# Patient Record
Sex: FEMALE | Race: WHITE | NOT HISPANIC OR LATINO | Employment: OTHER | URBAN - METROPOLITAN AREA
[De-identification: names, ages, dates, MRNs, and addresses within clinical notes are randomized per-mention and may not be internally consistent; named-entity substitution may affect disease eponyms.]

---

## 2018-10-09 ENCOUNTER — TRANSFERRED RECORDS (OUTPATIENT)
Dept: HEALTH INFORMATION MANAGEMENT | Facility: CLINIC | Age: 78
End: 2018-10-09

## 2019-10-08 ENCOUNTER — OFFICE VISIT (OUTPATIENT)
Dept: FAMILY MEDICINE | Facility: CLINIC | Age: 79
End: 2019-10-08
Payer: COMMERCIAL

## 2019-10-08 VITALS
HEIGHT: 65 IN | WEIGHT: 130 LBS | BODY MASS INDEX: 21.66 KG/M2 | HEART RATE: 59 BPM | RESPIRATION RATE: 18 BRPM | OXYGEN SATURATION: 98 % | DIASTOLIC BLOOD PRESSURE: 58 MMHG | TEMPERATURE: 98.8 F | SYSTOLIC BLOOD PRESSURE: 136 MMHG

## 2019-10-08 DIAGNOSIS — M67.472 GANGLION CYST OF BOTH FEET: ICD-10-CM

## 2019-10-08 DIAGNOSIS — M67.471 GANGLION CYST OF BOTH FEET: ICD-10-CM

## 2019-10-08 DIAGNOSIS — M85.89 OSTEOPENIA OF MULTIPLE SITES: ICD-10-CM

## 2019-10-08 DIAGNOSIS — Z72.0 TOBACCO ABUSE: ICD-10-CM

## 2019-10-08 DIAGNOSIS — K08.109 FULL DENTURES: ICD-10-CM

## 2019-10-08 DIAGNOSIS — Z97.2 FULL DENTURES: ICD-10-CM

## 2019-10-08 DIAGNOSIS — B35.1 ONYCHOMYCOSIS: ICD-10-CM

## 2019-10-08 DIAGNOSIS — Z00.00 ENCOUNTER FOR MEDICARE ANNUAL WELLNESS EXAM: Primary | ICD-10-CM

## 2019-10-08 DIAGNOSIS — Z13.6 CARDIOVASCULAR SCREENING; LDL GOAL LESS THAN 130: ICD-10-CM

## 2019-10-08 DIAGNOSIS — Z71.89 ADVANCED CARE PLANNING/COUNSELING DISCUSSION: ICD-10-CM

## 2019-10-08 LAB
CHOLEST SERPL-MCNC: 197 MG/DL
HDLC SERPL-MCNC: 66 MG/DL
LDLC SERPL CALC-MCNC: 123 MG/DL
NONHDLC SERPL-MCNC: 131 MG/DL
TRIGL SERPL-MCNC: 41 MG/DL

## 2019-10-08 PROCEDURE — 99213 OFFICE O/P EST LOW 20 MIN: CPT | Mod: 25 | Performed by: FAMILY MEDICINE

## 2019-10-08 PROCEDURE — 90662 IIV NO PRSV INCREASED AG IM: CPT | Performed by: FAMILY MEDICINE

## 2019-10-08 PROCEDURE — 36415 COLL VENOUS BLD VENIPUNCTURE: CPT | Performed by: FAMILY MEDICINE

## 2019-10-08 PROCEDURE — G0008 ADMIN INFLUENZA VIRUS VAC: HCPCS | Performed by: FAMILY MEDICINE

## 2019-10-08 PROCEDURE — 99387 INIT PM E/M NEW PAT 65+ YRS: CPT | Mod: 25 | Performed by: FAMILY MEDICINE

## 2019-10-08 PROCEDURE — 80061 LIPID PANEL: CPT | Performed by: FAMILY MEDICINE

## 2019-10-08 ASSESSMENT — MIFFLIN-ST. JEOR: SCORE: 1070.56

## 2019-10-08 NOTE — PROGRESS NOTES
"  SUBJECTIVE:   Nadiya Drummond is a 79 year old female who presents for Preventive Visit and chronic disease management.  Are you in the first 12 months of your Medicare Part B coverage?  No    Cysts of feet      Duration: months to years    Description  Location: intermittently painful cysts on top of both feet that are bothering her and she'd like to address. She says she dropped a board on her right foot several years ago by accident, and that cyst did get temporarially smaller.    Accompanying signs and symptoms: none    Precipitating or alleviating factors:  Trauma or overuse: no   Aggravating factors include: walking    Toenail fungus      Duration: years    Description  Location: all the toenails are affected, but she has particularly severe case of her large toes with peeling, cracking nails, and she's wondering if she could have her nails removed  Itching: no    Therapies tried and outcome: none    Physical Health:    In general, how would you rate your overall physical health? good    Outside of work, how many days during the week do you exercise? 2-3 days/week    Outside of work, approximately how many minutes a day do you exercise?15-30 minutes    If you drink alcohol do you typically have >3 drinks per day or >7 drinks per week? No    Do you usually eat at least 4 servings of fruit and vegetables a day, include whole grains & fiber and avoid regularly eating high fat or \"junk\" foods? NO    Do you have any problems taking medications regularly?  No    Do you have any side effects from medications? not applicable    Needs assistance for the following daily activities: no assistance needed    Which of the following safety concerns are present in your home?  none identified     Hearing impairment: No    In the past 6 months, have you been bothered by leaking of urine? yes    Mental Health:    In general, how would you rate your overall mental or emotional health? good  PHQ-2 Score:      Do you feel safe in " your environment? Yes    Do you have a Health Care Directive? No: Advance care planning reviewed with patient; information given to patient to review.    Additional concerns to address?  No    Fall risk:  Fallen 2 or more times in the past year?: No  Any fall with injury in the past year?: No    PHQ-2 Score:     PHQ-2 (  [employer]) 10/8/2019 2016   Q1: Little interest or pleasure in doing things 0 0   Q2: Feeling down, depressed or hopeless 0 0   PHQ-2 Score 0 0         Cognitive Screenin) Repeat 3 items (Leader, Season, Table)    2) Clock draw: NORMAL  3) 3 item recall: Recalls 3 objects  Results: 3 items recalled: COGNITIVE IMPAIRMENT LESS LIKELY    Mini-CogTM Copyright S Michael. Licensed by the author for use in [facility]; reprinted with permission (faby@[facility]). All rights reserved.      Do you have sleep apnea, excessive snoring or daytime drowsiness?: no          Reviewed and updated as needed this visit by clinical staff  Tobacco  Allergies  Meds  Problems  Med Hx  Surg Hx  Fam Hx  Soc Hx          Reviewed and updated as needed this visit by Provider  Problems        Social History     Tobacco Use     Smoking status: Current Every Day Smoker     Packs/day: 0.00     Years: 40.00     Pack years: 0.00     Types: Cigarettes     Smokeless tobacco: Never Used     Tobacco comment: 2 pks. weekly   Substance Use Topics     Alcohol use: No     Alcohol/week: 0.0 standard drinks                           Current providers sharing in care for this patient include:   Patient Care Team:  Carmen Beckett MD as PCP - General (Family Practice)  Carmen Beckett MD as Assigned PCP    The following health maintenance items are reviewed in Epic and correct as of today:  Health Maintenance   Topic Date Due     ZOSTER IMMUNIZATION (2 of 3) 2012     DEXA  10/04/2015     ADVANCE CARE PLANNING  2017     MEDICARE ANNUAL WELLNESS VISIT  2017     LIPID  2017      "FALL RISK ASSESSMENT  09/29/2017     PHQ-2  01/01/2019     INFLUENZA VACCINE (1) 09/01/2019     DTAP/TDAP/TD IMMUNIZATION (3 - Td) 09/29/2026     PNEUMOCOCCAL IMMUNIZATION 65+ LOW/MEDIUM RISK  Completed     IPV IMMUNIZATION  Aged Out     MENINGITIS IMMUNIZATION  Aged Out         ROS:  Constitutional, HEENT, cardiovascular, pulmonary, GI, , musculoskeletal, neuro, skin, endocrine and psych systems are negative, except as otherwise noted.    OBJECTIVE:   /58 (BP Location: Right arm, Patient Position: Sitting, Cuff Size: Adult Regular)   Pulse 59   Temp 98.8  F (37.1  C) (Tympanic)   Resp 18   Ht 1.651 m (5' 5\")   Wt 59 kg (130 lb)   SpO2 98%   BMI 21.63 kg/m   Estimated body mass index is 21.63 kg/m  as calculated from the following:    Height as of this encounter: 1.651 m (5' 5\").    Weight as of this encounter: 59 kg (130 lb).  EXAM:   GENERAL: healthy, alert and no distress. Frail, elderly.  EYES: Eyes grossly normal to inspection, PERRL and conjunctivae and sclerae normal  HENT: ear canals and TM's normal, nose and mouth without ulcers or lesions. Adentulous, dentures present upper and lower.  NECK: no adenopathy, no asymmetry, masses, or scars and thyroid normal to palpation  RESP: lungs clear to auscultation - no rales, rhonchi or wheezes  CV: regular rate and rhythm, normal S1 S2, no S3 or S4, no murmur, click or rub, no peripheral edema and peripheral pulses strong  ABDOMEN: soft, nontender, no hepatosplenomegaly, no masses and bowel sounds normal  MS: no gross musculoskeletal defects noted, no edema  SKIN: no suspicious lesions or rashes  NEURO: Normal strength and tone, mentation intact and speech normal  PSYCH: mentation appears normal, affect normal/bright  Diabetic foot exam: normal DP and PT pulses, no trophic changes or ulcerative lesions, normal sensory exam and onychomycosis noted with peeled, split great nails noted. Small, mobile, subcutaneous massed noted, one each of top of foot, no " overlying skin changes noted.    ASSESSMENT / PLAN:   1. Encounter for Medicare annual wellness exam  routine  - HC FLU VACCINE, INCREASED ANTIGEN, PRESV FREE [42210]    2. Osteopenia of multiple sites  Noted previously , due for follow up dexa, see order.  Will fu as indicated.   - DX Hip/Pelvis/Spine; Future    3. Ganglion cyst of both feet  Bothersome to Nadiya, and she requests podiatry referral.  - PODIATRY/FOOT & ANKLE SURGERY REFERRAL    4. Onychomycosis  See HPI, referral as below.  - PODIATRY/FOOT & ANKLE SURGERY REFERRAL    5. CARDIOVASCULAR SCREENING; LDL GOAL LESS THAN 130  LDL Cholesterol Calculated   Date Value Ref Range Status   09/29/2016 126 (H) <100 mg/dL Final     Comment:     Above desirable:  100-129 mg/dl   Borderline High:  130-159 mg/dL   High:             160-189 mg/dL   Very high:       >189 mg/dl      at goal  - Lipid panel reflex to direct LDL Fasting    6. Tobacco abuse  Motivational Interviewing  Target Behavior: smoking    Stage of Change: CONTEMPLATION (Considering change and yet undecided)    MI Intervention: Change talk (evoked)     Change Talk Expressed by the Patient: Desire to change Need to change    Provider Response to Change Talk: E - Evoked more info from patient about behavior change, A - Affirmed patient's thoughts, decisions, or attempts at behavior change, R - Reflected patient's change talk and S - Summarized patient's change talk statements     - TOBACCO USE TXMNT COUNSELING    7. Advanced care planning/counseling discussion  Paperwork provided today    8. Full dentures  Noted on exam      End of Life Planning:  Patient currently has an advanced directive: paperwork provided today    COUNSELING:  Reviewed preventive health counseling, as reflected in patient instructions       Immunizations    See AVS             Osteoporosis Prevention/Bone Health    Estimated body mass index is 21.63 kg/m  as calculated from the following:    Height as of this encounter: 1.651 m (5'  "5\").    Weight as of this encounter: 59 kg (130 lb).         reports that she has been smoking cigarettes. She has been smoking about 0.00 packs per day for the past 40.00 years. She has never used smokeless tobacco.  Tobacco Cessation Action Plan: discussion as above, she feels she could try \"cold turkey\".    Appropriate preventive services were discussed with this patient, including applicable screening as appropriate for cardiovascular disease, diabetes, osteopenia/osteoporosis, and glaucoma.  As appropriate for age/gender, discussed screening for colorectal cancer, prostate cancer, breast cancer, and cervical cancer. Checklist reviewing preventive services available has been given to the patient.    Reviewed patients plan of care and provided an AVS. The Intermediate Care Plan ( asthma action plan, low back pain action plan, and migraine action plan) for Nadiya meets the Care Plan requirement. This Care Plan has been established and reviewed with the Patient.    Counseling Resources:  ATP IV Guidelines  Pooled Cohorts Equation Calculator  Breast Cancer Risk Calculator  FRAX Risk Assessment  ICSI Preventive Guidelines  Dietary Guidelines for Americans, 2010  USDA's MyPlate  ASA Prophylaxis  Lung CA Screening    Carmen Beckett MD  [facility]  "

## 2019-10-08 NOTE — PATIENT INSTRUCTIONS
"I strongly recommend getting the shingles vaccine (Shingrix) if you are over age 50, but please check with your insurance to see how much you might have to pay for this vaccine! If you are on Medicare, it's covered if you get it at a pharmacy but not in a clinic.    This shot will prevent shingles, a painful skin rash that you are at risk for getting if you have ever had chicken pox. Sometimes the pain will last the rest of your life, even after the rash has healed, and there is not much that we can do to relieve the pain.    Please consider getting this vaccine!      Patient Education   Personalized Prevention Plan  You are due for the preventive services outlined below.  Your care team is available to assist you in scheduling these services.  If you have already completed any of these items, please share that information with your care team to update in your medical record.  Health Maintenance Due   Topic Date Due     Zoster (Shingles) Vaccine (2 of 3) 11/12/2012     Osteoporosis Screening  10/04/2015     Discuss Advance Care Planning  09/17/2017     Annual Wellness Visit  09/29/2017     Cholesterol Lab  09/29/2017     FALL RISK ASSESSMENT  09/29/2017     PHQ-2  01/01/2019     Flu Vaccine (1) 09/01/2019         Ht Readings from Last 2 Encounters:   10/08/19 1.651 m (5' 5\")   09/29/16 1.664 m (5' 5.5\")     Patient Education   Preventive Health Recommendations    See your health care provider every year to    Review health changes.     Discuss preventive care.      Review your medicines if your doctor has prescribed any.    You no longer need a yearly Pap test unless you've had an abnormal Pap test in the past 10 years. If you have vaginal symptoms, such as bleeding or discharge, be sure to talk with your provider about a Pap test.    Every 1 to 2 years, have a mammogram.  If you are over 69, talk with your health care provider about whether or not you want to continue having screening mammograms.    Every 10 years, " have a colonoscopy. Or, have a yearly FIT test (stool test). These exams will check for colon cancer.     Have a cholesterol test every 5 years, or more often if your doctor advises it.     Have a diabetes test (fasting glucose) every three years. If you are at risk for diabetes, you should have this test more often.     At age 65, have a bone density scan (DEXA) to check for osteoporosis (brittle bone disease).    Shots:    Get a flu shot each year.    Get a tetanus shot every 10 years.    Talk to your doctor about your pneumonia vaccines. There are now two you should receive - Pneumovax (PPSV 23) and Prevnar (PCV 13).    Talk to your pharmacist about the shingles vaccine.    Talk to your doctor about the hepatitis B vaccine.    Nutrition:     Eat at least 5 servings of fruits and vegetables each day.    Eat whole-grain bread, whole-wheat pasta and brown rice instead of white grains and rice.    Get adequate Calcium and Vitamin D.     Lifestyle    Exercise at least 150 minutes a week (30 minutes a day, 5 days a week). This will help you control your weight and prevent disease.    Limit alcohol to one drink per day.    No smoking.     Wear sunscreen to prevent skin cancer.     See your dentist twice a year for an exam and cleaning.    See your eye doctor every 1 to 2 years to screen for conditions such as glaucoma, macular degeneration and cataracts.    Personalized Prevention Plan  You are due for the preventive services outlined below.  Your care team is available to assist you in scheduling these services.  If you have already completed any of these items, please share that information with your care team to update in your medical record.  Health Maintenance Due   Topic Date Due     Zoster (Shingles) Vaccine (2 of 3) 11/12/2012     Osteoporosis Screening  10/04/2015     Discuss Advance Care Planning  09/17/2017     Annual Wellness Visit  09/29/2017     Cholesterol Lab  09/29/2017     FALL RISK ASSESSMENT   09/29/2017     PHQ-2  01/01/2019     Flu Vaccine (1) 09/01/2019         It is time for your dexa!  Please call our clinic to schedule this test. You do not need to fast prior to the test, although it is recommended that you NOT take calcium on the day of the test.    If you have any questions, please contact the clinic or schedule an appointment with me, thank you!     [facility]  [address] [place] [address]     Phone: [phone]  Fax: [phone]

## 2019-10-08 NOTE — LETTER
[facility]  [address] [place] [id number] [phone]      October 18, 2019    Nadiya Drummond                                                                                                                     [address] [facility] [address]        Dear Nadiya,  Thank you for getting your cholesterol checked!   Desired or goal levels are:   CHOLESTEROL: Desirable is less than 200.   HDL (Good Cholesterol): Desirable is greater than 40 in men and greater than 50 in women.   LDL (Bad Cholesterol): Desirable is less than 130   TRIGLYCERIDES: Desirable is less than 150.     Your cholesterol is fantastic!  Your total cholesterol is low, and your HDL, or good cholesterol, is high, which is great, as this protects your heart from heart disease and shows that you get a good amount of exercise.       Your triglycerides are also very low.  Triglycerides are increased by eating lots of sugar, including juice, excess fruit, bread, pasta, rice and cereal, so you must not eat a lot of these things (or you have good genes!)       I recommend rechecking your fasting lipids every few years.   If you have any questions, please contact the clinic or schedule an appointment with me, thank you!     Results for orders placed or performed in visit on 10/08/19   Lipid panel reflex to direct LDL Fasting   Result Value Ref Range    Cholesterol 197 <200 mg/dL    Triglycerides 41 <150 mg/dL    HDL Cholesterol 66 >49 mg/dL    LDL Cholesterol Calculated 123 (H) <100 mg/dL    Non HDL Cholesterol 131 (H) <130 mg/dL       Sincerely,     Carmen Beckett MD.

## 2019-10-18 NOTE — RESULT ENCOUNTER NOTE
Thank you for getting your cholesterol checked!  Desired or goal levels are:  CHOLESTEROL: Desirable is less than 200.  HDL (Good Cholesterol): Desirable is greater than 40 in men and greater than 50 in women.  LDL (Bad Cholesterol): Desirable is less than 130  TRIGLYCERIDES: Desirable is less than 150.    Your cholesterol is fantastic!  Your total cholesterol is low, and your HDL, or good cholesterol, is high, which is great, as this protects your heart from heart disease and shows that you get a good amount of exercise.      Your triglycerides are also very low.  Triglycerides are increased by eating lots of sugar, including juice, excess fruit, bread, pasta, rice and cereal, so you must not eat a lot of these things (or you have good genes!)      I recommend rechecking your fasting lipids every few years.      If you have any questions, please contact the clinic or schedule an appointment with me, thank you!    Sincerely,  Dr. Carmen Beckett MD  10/17/2019

## 2019-10-29 ENCOUNTER — ANCILLARY PROCEDURE (OUTPATIENT)
Dept: BONE DENSITY | Facility: CLINIC | Age: 79
End: 2019-10-29
Attending: FAMILY MEDICINE
Payer: COMMERCIAL

## 2019-10-29 DIAGNOSIS — M85.89 OSTEOPENIA OF MULTIPLE SITES: ICD-10-CM

## 2019-10-29 PROCEDURE — 77085 DXA BONE DENSITY AXL VRT FX: CPT | Performed by: INTERNAL MEDICINE

## 2020-07-31 ENCOUNTER — NURSE TRIAGE (OUTPATIENT)
Dept: FAMILY MEDICINE | Facility: CLINIC | Age: 80
End: 2020-07-31

## 2020-07-31 NOTE — TELEPHONE ENCOUNTER
I asked her to go to  to be assessed, gave her information to the [facility] and [facility]. She said she will think about it, I asked her to call back if she has further questions.    Juliane Weiss RN      Additional Information    Negative: Shock suspected (e.g., cold/pale/clammy skin, too weak to stand, low BP, rapid pulse)    Negative: Difficult to awaken or acting confused (e.g., disoriented, slurred speech)    Negative: Fainted, and still feels dizzy afterwards    Negative: Severe difficulty breathing (e.g., struggling for each breath, speaks in single words)    Negative: Overdose (accidental or intentional) of medications    Negative: New neurologic deficit that is present now: * Weakness of the face, arm, or leg on one side of the body * Numbness of the face, arm, or leg on one side of the body * Loss of speech or garbled speech    Negative: Heart beating < 50 beats per minute OR > 140 beats per minute    Negative: Sounds like a life-threatening emergency to the triager    Negative: Chest pain    Negative: Rectal bleeding, bloody stool, or tarry-black stool    Negative: Vomiting is the main symptom    Negative: Diarrhea is the main symptom    Negative: Headache is the main symptom    Negative: Heat exhaustion suspected (i.e., dehydration from heat exposure)    Negative: Patient states that he/she is having an anxiety/panic attack    Negative: SEVERE dizziness (e.g., unable to stand, requires support to walk, feels like passing out now)    Negative: SEVERE headache or neck pain    Negative: Spinning or tilting sensation (vertigo) present now and one or more stroke risk factors (i.e., hypertension, diabetes, prior stroke/TIA, heart attack, age over 60) (Exception: prior physician evaluation for this AND no different/worse than usual)    Negative: Loss of vision or double vision    Negative: Extra heart beats OR irregular heart beating (i.e., 'palpitations')    Negative: Difficulty breathing     "Negative: Drinking very little and has signs of dehydration (e.g., no urine > 12 hours, very dry mouth, very lightheaded)    Negative: Follows bleeding (e.g., stomach, rectum, vagina) (Exception: became dizzy from sight of small amount blood)    Negative: Patient sounds very sick or weak to the triager    Spinning or tilting sensation (vertigo) present now    Answer Assessment - Initial Assessment Questions  1. DESCRIPTION: \"Describe your dizziness.\"      Off balence  2. LIGHTHEADED: \"Do you feel lightheaded?\" (e.g., somewhat faint, woozy, weak upon standing)      Yes at times  3. VERTIGO: \"Do you feel like either you or the room is spinning or tilting?\" (i.e. vertigo)      Yes  4. SEVERITY: \"How bad is it?\"  \"Do you feel like you are going to faint?\" \"Can you stand and walk?\"    - MILD - walking normally    - MODERATE - interferes with normal activities (e.g., work, school)     - SEVERE - unable to stand, requires support to walk, feels like passing out now.       Mild  5. ONSET:  \"When did the dizziness begin?\"      On and off for a week  6. AGGRAVATING FACTORS: \"Does anything make it worse?\" (e.g., standing, change in head position)      NO   7. HEART RATE: \"Can you tell me your heart rate?\" \"How many beats in 15 seconds?\"  (Note: not all patients can do this)      She said it does not feel fast  8. CAUSE: \"What do you think is causing the dizziness?\"      unknown  9. RECURRENT SYMPTOM: \"Have you had dizziness before?\" If so, ask: \"When was the last time?\" \"What happened that time?\"      Yes, it went away  10. OTHER SYMPTOMS: \"Do you have any other symptoms?\" (e.g., fever, chest pain, vomiting, diarrhea, bleeding)        Swollen gland in neck, no cheat pain, SOB, numbness in fad or one side of the body, no headache or feeling like she is going to pass out  11. PREGNANCY: \"Is there any chance you are pregnant?\" \"When was your last menstrual period?\"        No    Protocols used: DIZZINESS-A-OH      "

## 2020-11-25 ENCOUNTER — OFFICE VISIT (OUTPATIENT)
Dept: FAMILY MEDICINE | Facility: CLINIC | Age: 80
End: 2020-11-25
Payer: COMMERCIAL

## 2020-11-25 VITALS
RESPIRATION RATE: 14 BRPM | SYSTOLIC BLOOD PRESSURE: 118 MMHG | OXYGEN SATURATION: 99 % | TEMPERATURE: 97.8 F | BODY MASS INDEX: 19.8 KG/M2 | WEIGHT: 119 LBS | DIASTOLIC BLOOD PRESSURE: 66 MMHG | HEART RATE: 66 BPM

## 2020-11-25 DIAGNOSIS — H11.31 SUBCONJUNCTIVAL HEMORRHAGE OF RIGHT EYE: Primary | ICD-10-CM

## 2020-11-25 DIAGNOSIS — Z82.49 FAMILY HISTORY OF CAROTID ARTERY STENOSIS: ICD-10-CM

## 2020-11-25 PROCEDURE — 99213 OFFICE O/P EST LOW 20 MIN: CPT | Performed by: FAMILY MEDICINE

## 2020-11-25 NOTE — PROGRESS NOTES
Subjective     Nadiay Drummond is a 80 year old female who presents to clinic today for the following health issues:    HPI         Concern - burst vein   Onset: Yesterday  Description: burst vein in right eye first. Now notice right vein is larger then the left vein  Progression of Symptoms:  same and intermittent  Accompanying Signs & Symptoms: lightheaded and dizzy when she got up in the middle of night.  Previous history of similar problem: none  Therapies tried and outcome: None    She says two days ago when she woke up there was blood vessel broken in her right eye off to one side. No trauma. no pain. She has otherwise felt fine. During the night when she got up to go to the bathroom she felt dizzy, but otherwise felt fine. Eye much redder next morning. Otherwise has felt fine. She also says her neck artery is slater on the right side than the left. She notes this because her sister had to have carotid surgery for clogging in her arteries. Wonders if this is related. Her sister had some other things going on as well--pt is not sure what.       Review of Systems    as above       Objective    /66 (BP Location: Left arm, Patient Position: Sitting, Cuff Size: Adult Regular)   Pulse 66   Temp 97.8  F (36.6  C) (Tympanic)   Resp 14   Wt 54 kg (119 lb)   SpO2 99%   BMI 19.80 kg/m    Body mass index is 19.8 kg/m .  Physical Exam   GENERAL: healthy, alert and no distress  EYES: left eye normal to inspection. Right eye with subconjunctival hemorrhage present medially  NECK: no carotid bruits             Assessment & Plan     Subconjunctival hemorrhage of right eye  Reassurance given   Discussed it may take 1-2 weeks for her eye to look normal again. Pt had already done some reading about this and feels she knows what to expect, just wanted to make sure.     Family history of carotid artery stenosis  No bruits on exam today.   Asymptomatic, no history of TIA         Tobacco Cessation:   reports that she has  been smoking cigarettes. She has been smoking about 0.00 packs per day for the past 40.00 years. She has never used smokeless tobacco.          Return in about 3 months (around 2/25/2021) for Routine preventive with PCP.    Kellee De La Vega MD, MD  [facility]

## 2021-02-04 ENCOUNTER — IMMUNIZATION (OUTPATIENT)
Dept: NURSING | Facility: CLINIC | Age: 81
End: 2021-02-04
Payer: COMMERCIAL

## 2021-02-04 PROCEDURE — 91300 PR COVID VAC PFIZER DIL RECON 30 MCG/0.3 ML IM: CPT

## 2021-02-04 PROCEDURE — 0001A PR COVID VAC PFIZER DIL RECON 30 MCG/0.3 ML IM: CPT

## 2021-02-25 ENCOUNTER — IMMUNIZATION (OUTPATIENT)
Dept: NURSING | Facility: CLINIC | Age: 81
End: 2021-02-25
Attending: INTERNAL MEDICINE
Payer: COMMERCIAL

## 2021-02-25 PROCEDURE — 0002A PR COVID VAC PFIZER DIL RECON 30 MCG/0.3 ML IM: CPT

## 2021-02-25 PROCEDURE — 91300 PR COVID VAC PFIZER DIL RECON 30 MCG/0.3 ML IM: CPT

## 2021-07-16 ENCOUNTER — OFFICE VISIT (OUTPATIENT)
Dept: FAMILY MEDICINE | Facility: CLINIC | Age: 81
End: 2021-07-16
Payer: COMMERCIAL

## 2021-07-16 VITALS
BODY MASS INDEX: 20.47 KG/M2 | OXYGEN SATURATION: 97 % | WEIGHT: 123 LBS | HEART RATE: 70 BPM | RESPIRATION RATE: 15 BRPM | SYSTOLIC BLOOD PRESSURE: 131 MMHG | TEMPERATURE: 98.1 F | DIASTOLIC BLOOD PRESSURE: 69 MMHG

## 2021-07-16 DIAGNOSIS — R07.9 CHEST PAIN, UNSPECIFIED TYPE: Primary | ICD-10-CM

## 2021-07-16 DIAGNOSIS — Z72.0 TOBACCO ABUSE: ICD-10-CM

## 2021-07-16 LAB
ALBUMIN SERPL-MCNC: 4 G/DL (ref 3.4–5)
ALP SERPL-CCNC: 58 U/L (ref 40–150)
ALT SERPL W P-5'-P-CCNC: 23 U/L (ref 0–50)
ANION GAP SERPL CALCULATED.3IONS-SCNC: 3 MMOL/L (ref 3–14)
AST SERPL W P-5'-P-CCNC: 23 U/L (ref 0–45)
BASOPHILS # BLD AUTO: 0.1 10E3/UL (ref 0–0.2)
BASOPHILS NFR BLD AUTO: 1 %
BILIRUB SERPL-MCNC: 0.7 MG/DL (ref 0.2–1.3)
BUN SERPL-MCNC: 10 MG/DL (ref 7–30)
CALCIUM SERPL-MCNC: 9.1 MG/DL (ref 8.5–10.1)
CHLORIDE BLD-SCNC: 110 MMOL/L (ref 94–109)
CHOLEST SERPL-MCNC: 209 MG/DL
CO2 SERPL-SCNC: 26 MMOL/L (ref 20–32)
CREAT SERPL-MCNC: 0.76 MG/DL (ref 0.52–1.04)
EOSINOPHIL # BLD AUTO: 0.3 10E3/UL (ref 0–0.7)
EOSINOPHIL NFR BLD AUTO: 5 %
ERYTHROCYTE [DISTWIDTH] IN BLOOD BY AUTOMATED COUNT: 12.2 % (ref 10–15)
FASTING STATUS PATIENT QL REPORTED: YES
GFR SERPL CREATININE-BSD FRML MDRD: 74 ML/MIN/1.73M2
GLUCOSE BLD-MCNC: 94 MG/DL (ref 70–99)
HCT VFR BLD AUTO: 36.2 % (ref 35–47)
HDLC SERPL-MCNC: 67 MG/DL
HGB BLD-MCNC: 12.4 G/DL (ref 11.7–15.7)
IMM GRANULOCYTES # BLD: 0 10E3/UL
IMM GRANULOCYTES NFR BLD: 0 %
LDLC SERPL CALC-MCNC: 129 MG/DL
LYMPHOCYTES # BLD AUTO: 1.7 10E3/UL (ref 0.8–5.3)
LYMPHOCYTES NFR BLD AUTO: 31 %
MCH RBC QN AUTO: 29.9 PG (ref 26.5–33)
MCHC RBC AUTO-ENTMCNC: 34.3 G/DL (ref 31.5–36.5)
MCV RBC AUTO: 87 FL (ref 78–100)
MONOCYTES # BLD AUTO: 0.5 10E3/UL (ref 0–1.3)
MONOCYTES NFR BLD AUTO: 8 %
NEUTROPHILS # BLD AUTO: 3 10E3/UL (ref 1.6–8.3)
NEUTROPHILS NFR BLD AUTO: 54 %
NONHDLC SERPL-MCNC: 142 MG/DL
PLATELET # BLD AUTO: 275 10E3/UL (ref 150–450)
POTASSIUM BLD-SCNC: 3.8 MMOL/L (ref 3.4–5.3)
PROT SERPL-MCNC: 6.7 G/DL (ref 6.8–8.8)
RBC # BLD AUTO: 4.15 10E6/UL (ref 3.8–5.2)
SODIUM SERPL-SCNC: 139 MMOL/L (ref 133–144)
TRIGL SERPL-MCNC: 65 MG/DL
TROPONIN I SERPL-MCNC: <0.015 UG/L (ref 0–0.04)
WBC # BLD AUTO: 5.5 10E3/UL (ref 4–11)

## 2021-07-16 PROCEDURE — 85025 COMPLETE CBC W/AUTO DIFF WBC: CPT | Performed by: FAMILY MEDICINE

## 2021-07-16 PROCEDURE — 93000 ELECTROCARDIOGRAM COMPLETE: CPT | Performed by: FAMILY MEDICINE

## 2021-07-16 PROCEDURE — 84484 ASSAY OF TROPONIN QUANT: CPT | Performed by: FAMILY MEDICINE

## 2021-07-16 PROCEDURE — 36415 COLL VENOUS BLD VENIPUNCTURE: CPT | Performed by: FAMILY MEDICINE

## 2021-07-16 PROCEDURE — 99214 OFFICE O/P EST MOD 30 MIN: CPT | Performed by: FAMILY MEDICINE

## 2021-07-16 PROCEDURE — 80061 LIPID PANEL: CPT | Performed by: FAMILY MEDICINE

## 2021-07-16 PROCEDURE — 80053 COMPREHEN METABOLIC PANEL: CPT | Performed by: FAMILY MEDICINE

## 2021-07-16 NOTE — LETTER
July 23, 2021      Nadiya Drummond  [address] [facility] [id number]        Dear ,    We are writing to inform you of your test results.     Thank you for getting labs done! Everything looks good!     Your troponin test was normal, you do not have heart muscle damage.   Your cbc, or complete blood count, which measures red blood cells (to check for anemia and other vitamin deficiencies) and white blood cells (to check for infection and leukemia) is normal, which is great!  Your platelets, which reflect liver function and ability to clot, are normal as well.     The testing of your blood sugar, kidney function, liver function and electrolytes was normal.     Your cholesterol is at goal.     If you have any questions, please contact the clinic or schedule an appointment with me, thank you!            Resulted Orders   Lipid panel reflex to direct LDL Fasting   Result Value Ref Range    Cholesterol 209 (H) <200 mg/dL      Comment:      Age 0-19 years  Desirable: <170 mg/dL  Borderline high:  170-199 mg/dl  High:            >199 mg/dl    Age 20 years and older  Desirable: <200 mg/dL    Triglycerides 65 <150 mg/dL      Comment:      0-9 years:  Normal:    Less than 75 mg/dL  Borderline high:  75-99 mg/dL  High:             Greater than or equal to 100 mg/dL    0-19 years:  Normal:    Less than 90 mg/dL  Borderline high:   mg/dL  High:             Greater than or equal to 130 mg/dL    20 years and older:  Normal:    Less than 150 mg/dL  Borderline high:  150-199 mg/dL  High:             200-499 mg/dL  Very high:   Greater than or equal to 500 mg/dL    Direct Measure HDL 67 >=50 mg/dL      Comment:      0-19 years:       Greater than or equal to 45 mg/dL   Low: Less than 40 mg/dL   Borderline low: 40-44 mg/dL     20 years and older:   Female: Greater than or equal to 50 mg/dL   Male:   Greater than or equal to 40 mg/dL         LDL Cholesterol Calculated 129 (H) <=100 mg/dL      Comment:      Age 0-19  years:  Desirable: 0-110 mg/dL   Borderline high: 110-129 mg/dL   High: >= 130 mg/dL    Age 20 years and older:  Desirable: <100mg/dL  Above desirable: 100-129 mg/dL   Borderline high: 130-159 mg/dL   High: 160-189 mg/dL   Very high: >= 190 mg/dL    Non HDL Cholesterol 142 (H) <130 mg/dL      Comment:      0-19 years:  Desirable:          Less than 120 mg/dL  Borderline high:   120-144 mg/dL  High:                   Greater than or equal to 145 mg/dL    20 years and older:  Desirable:          130 mg/dL  Above Desirable: 130-159 mg/dL  Borderline high:   160-189 mg/dL  High:               190-219 mg/dL  Very high:     Greater than or equal to 220 mg/dL    Patient Fasting > 8hrs? Yes    Comprehensive metabolic panel   Result Value Ref Range    Sodium 139 133 - 144 mmol/L    Potassium 3.8 3.4 - 5.3 mmol/L    Chloride 110 (H) 94 - 109 mmol/L    Carbon Dioxide (CO2) 26 20 - 32 mmol/L    Anion Gap 3 3 - 14 mmol/L    Urea Nitrogen 10 7 - 30 mg/dL    Creatinine 0.76 0.52 - 1.04 mg/dL    Calcium 9.1 8.5 - 10.1 mg/dL    Glucose 94 70 - 99 mg/dL    Alkaline Phosphatase 58 40 - 150 U/L    AST 23 0 - 45 U/L    ALT 23 0 - 50 U/L    Protein Total 6.7 (L) 6.8 - 8.8 g/dL    Albumin 4.0 3.4 - 5.0 g/dL    Bilirubin Total 0.7 0.2 - 1.3 mg/dL    GFR Estimate 74 >60 mL/min/1.73m2      Comment:      As of July 11, 2021, eGFR is calculated by the CKD-EPI creatinine equation, without race adjustment. eGFR can be influenced by muscle mass, exercise, and diet. The reported eGFR is an estimation only and is only applicable if the renal function is stable.   Troponin I   Result Value Ref Range    Troponin I <0.015 0.000 - 0.045 ug/L      Comment:      The 99th percentile for upper reference range is 0.045ug/L.  Troponin values in the range of 0.045 - 0.120 ug/L may be associated with risks of adverse clinical events.   CBC with platelets and differential   Result Value Ref Range    WBC Count 5.5 4.0 - 11.0 10e3/uL    RBC Count 4.15 3.80 - 5.20  10e6/uL    Hemoglobin 12.4 11.7 - 15.7 g/dL    Hematocrit 36.2 35.0 - 47.0 %    MCV 87 78 - 100 fL    MCH 29.9 26.5 - 33.0 pg    MCHC 34.3 31.5 - 36.5 g/dL    RDW 12.2 10.0 - 15.0 %    Platelet Count 275 150 - 450 10e3/uL    % Neutrophils 54 %    % Lymphocytes 31 %    % Monocytes 8 %    % Eosinophils 5 %    % Basophils 1 %    % Immature Granulocytes 0 %    Absolute Neutrophils 3.0 1.6 - 8.3 10e3/uL    Absolute Lymphocytes 1.7 0.8 - 5.3 10e3/uL    Absolute Monocytes 0.5 0.0 - 1.3 10e3/uL    Absolute Eosinophils 0.3 0.0 - 0.7 10e3/uL    Absolute Basophils 0.1 0.0 - 0.2 10e3/uL    Absolute Immature Granulocytes 0.0 <=0.0 10e3/uL       If you have any questions or concerns, please call the clinic at the number listed above.       Sincerely,      Carmen Beckett MD/ap

## 2021-07-16 NOTE — RESULT ENCOUNTER NOTE
Patient was seen today in clinic.  I discussed results in clinic, please see clinic progress note.    Carmen Beckett MD 7/16/2021

## 2022-01-09 ENCOUNTER — VIRTUAL VISIT (OUTPATIENT)
Dept: URGENT CARE | Facility: CLINIC | Age: 82
End: 2022-01-09
Payer: COMMERCIAL

## 2022-01-09 DIAGNOSIS — Z20.822 EXPOSURE TO 2019 NOVEL CORONAVIRUS: Primary | ICD-10-CM

## 2022-01-09 PROCEDURE — 99442 PR PHYSICIAN TELEPHONE EVALUATION 11-20 MIN: CPT | Mod: 95 | Performed by: NURSE PRACTITIONER

## 2022-01-09 NOTE — PROGRESS NOTES
SUBJECTIVE:   Nadiya Drummond is a 81 year old female presenting with a chief complaint of covid exposure from   No symptoms  Has had vaccines.     Past Medical History:   Diagnosis Date     Lactose intolerance      TMJ (temporomandibular joint disorder)      Tobacco abuse      Current Outpatient Medications   Medication Sig Dispense Refill     Multiple Vitamins-Minerals (OCUVITE PO) Eye vitamins daily       UNABLE TO FIND OcuviteMEDICATION NAME: Ocuvite       UNABLE TO FIND MEDICATION NAME: Move Free       Social History     Tobacco Use     Smoking status: Current Every Day Smoker     Packs/day: 0.25     Years: 40.00     Pack years: 10.00     Types: Cigarettes     Start date: 1/1/1965     Smokeless tobacco: Never Used     Tobacco comment: 2 pks. weekly   Substance Use Topics     Alcohol use: No     Alcohol/week: 0.0 standard drinks       ROS:  Review of systems negative except as stated above.    OBJECTIVE:  There were no vitals taken for this visit.  GENERAL APPEARANCE:  alert and no distress  RESP: lungs clear   SKIN: no suspicious lesions or rashes    ASSESSMENT:  (Z20.822) Exposure to 2019 novel coronavirus  (primary encounter diagnosis)    Plan: Asymptomatic COVID-19 Virus (Coronavirus) by         PCR    Telephone time spent 12 minutes    Lucy See, DARWIN CNP

## 2022-01-12 ENCOUNTER — LAB (OUTPATIENT)
Dept: LAB | Facility: CLINIC | Age: 82
End: 2022-01-12
Attending: NURSE PRACTITIONER
Payer: COMMERCIAL

## 2022-01-12 DIAGNOSIS — Z20.822 EXPOSURE TO 2019 NOVEL CORONAVIRUS: ICD-10-CM

## 2022-01-12 PROCEDURE — U0005 INFEC AGEN DETEC AMPLI PROBE: HCPCS | Mod: 90

## 2022-01-12 PROCEDURE — U0003 INFECTIOUS AGENT DETECTION BY NUCLEIC ACID (DNA OR RNA); SEVERE ACUTE RESPIRATORY SYNDROME CORONAVIRUS 2 (SARS-COV-2) (CORONAVIRUS DISEASE [COVID-19]), AMPLIFIED PROBE TECHNIQUE, MAKING USE OF HIGH THROUGHPUT TECHNOLOGIES AS DESCRIBED BY CMS-2020-01-R: HCPCS | Mod: 90

## 2022-01-12 PROCEDURE — 99000 SPECIMEN HANDLING OFFICE-LAB: CPT

## 2022-01-13 ENCOUNTER — TELEPHONE (OUTPATIENT)
Dept: LAB | Facility: CLINIC | Age: 82
End: 2022-01-13
Payer: COMMERCIAL

## 2022-01-13 LAB — SARS-COV-2 RNA RESP QL NAA+PROBE: DETECTED

## 2022-01-13 NOTE — TELEPHONE ENCOUNTER
"HR RISK FACTOR # 0          Coronavirus (COVID-19) Notification    Caller Name (Patient, parent, daughter/son, grandparent, etc)  patient    Reason for call  Notify of Positive Coronavirus (COVID-19) lab results, assess symptoms,  review  [facility] recommendations    Lab Result    Lab test:  2019-nCoV rRt-PCR or SARS-CoV-2 PCR    Oropharyngeal AND/OR nasopharyngeal swabs is POSITIVE for 2019-nCoV RNA/SARS-COV-2 PCR (COVID-19 virus)    RN Recommendations/Instructions per  [employer] [facility] Coronavirus COVID-19 recommendations    Brief introduction script  Introduce self then review script:  \"I am calling on behalf of [employer].  We were notified that your Coronavirus test (COVID-19) for was POSITIVE for the virus.  I have some information to relay to you but first I wanted to mention that the [place] Dept of Health will be contacting you shortly [it's possible MD already called Patient] to talk to you more about how you are feeling and other people you have had contact with who might now also have the virus.  Also,  [employer] [facility] is Partnering with the [facility] for Covid-19 research, you may be contacted directly by research staff.\"    Patient reports she is fully vaccinated for Covid with Pfizer.     Assessment (Inquire about Patient's current symptoms)   Assessment   Current Symptoms at time of phone call: (if no symptoms, document No symptoms] No symptoms   Symptoms onset (if applicable) n/a     If at time of call, Patients symptoms hare worsened, the Patient should contact 911 or have someone drive them to Emergency Dept promptly:      If Patient calling 911, inform 911 personal that you have tested positive for the Coronavirus (COVID-19).  Place mask on and await 911 to arrive.    If Emergency Dept, If possible, please have another adult drive you to the Emergency Dept but you need to wear mask when in contact with other people.          Treatment Options:   Is the MASSBP score 4 or greator? " No.  You may be eligible to receive a new treatment with a monoclonal antibody for preventing hospitalization in patients at high risk for complications from COVID-19.   This medication is still experimental and available on a limited basis; it is given through an IV and must be given at an infusion center. Please note that not all people who are eligible will receive the medication since it is in limited supply.     Yes.   Is the patient symptomatic? No. Patient does not qualify.      Review information with Patient    Your result was positive. This means you have COVID-19 (coronavirus).  We have sent you a letter that reviews the information that I'll be reviewing with you now.    How can I protect others?    If you have symptoms: stay home and away from others (self-isolate) until:    You've had no fever--and no medicine that reduces fever--for 1 full day (24 hours). And       Your other symptoms have gotten better. For example, your cough or breathing has improved. And     At least 10 days have passed since your symptoms started. (If you've been told by a doctor that you have a weak immune system, wait 20 days.)     If you don't have symptoms: Stay home and away from others (self-isolate) until at least 10 days have passed since your first positive COVID-19 test. (Date test collected)    During this time:    Stay in your own room, including for meals. Use your own bathroom if you can.    Stay away from others in your home. No hugging, kissing or shaking hands. No visitors.     Don't go to work, school or anywhere else.     Clean  high touch  surfaces often (doorknobs, counters, handles, etc.). Use a household cleaning spray or wipes. You'll find a full list on the EPA website at www.epa.gov/pesticide-registration/list-n-disinfectants-use-against-sars-cov-2.     Cover your mouth and nose with a mask, tissue or other face covering to avoid spreading germs.    Wash your hands and face often with soap and  water.    Make a list of people you have been in close contact with recently, even if either of you wore a face covering.   - Start your list from 2 days before you became ill or had a positive test.  - Include anyone that was within 6 feet of you for a cumulative total of 15 minutes or more in 24 hours. (Example: if you sat next to Jesus for 5 minutes in the morning and 10 minutes in the afternoon, then you were in close contact for 15 minutes total that day. Jesus would be added to your list.)    A public health worker will call or text you. It is important that you answer. They will ask you questions about possible exposures to COVID-19, such as people you have been in direct contact with and places you have visited.    Tell the people on your list that you have COVID-19; they should stay away from others for 14 days starting from the last time they were in contact with you (unless you are told something different from a public health worker).     Caregivers in these groups are at risk for severe illness due to COVID-19:  o People 65 years and older  o People who live in a nursing home or long-term care facility  o People with chronic disease (lung, heart, cancer, diabetes, kidney, liver, immunologic)  o People who have a weakened immune system, including those who:  - Are in cancer treatment  - Take medicine that weakens the immune system, such as corticosteroids  - Had a bone marrow or organ transplant  - Have an immune deficiency  - Have poorly controlled HIV or AIDS  - Are obese (body mass index of 40 or higher)  - Smoke regularly    Caregivers should wear gloves while washing dishes, handling laundry and cleaning bedrooms and bathrooms.    Wash and dry laundry with special caution. Don't shake dirty laundry, and use the warmest water setting you can.    If you have a weakened immune system, ask your doctor about other actions you should take.    For more tips, go to  www.cdc.gov/coronavirus/2019-ncov/downloads/10Things.pdf.    You should not go back to work until you meet the guidelines above for ending your home isolation. You don't need to be retested for COVID-19 before going back to work--studies show that you won't spread the virus if it's been at least 10 days since your symptoms started (or 20 days, if you have a weak immune system).    Employers: This document serves as formal notice of your employee's medical guidelines for going back to work. They must meet the above guidelines before going back to work in person.    How can I take care of myself?    1. Get lots of rest. Drink extra fluids (unless a doctor has told you not to).    2. Take Tylenol (acetaminophen) for fever or pain. If you have liver or kidney problems, ask your family doctor if it's okay to take Tylenol.     Take either:     650 mg (two 325 mg pills) every 4 to 6 hours, or     1,000 mg (two 500 mg pills) every 8 hours as needed.     Note: Don't take more than 3,000 mg in one day. Acetaminophen is found in many medicines (both prescribed and over-the-counter medicines). Read all labels to be sure you don't take too much.    For children, check the Tylenol bottle for the right dose (based on their age or weight).    3. If you have other health problems (like cancer, heart failure, an organ transplant or severe kidney disease): Call your specialty clinic if you don't feel better in the next 2 days.    4. Know when to call 911: Emergency warning signs include:    Trouble breathing or shortness of breath    Pain or pressure in the chest that doesn't go away    Feeling confused like you haven't felt before, or not being able to wake up    Bluish-colored lips or face    5. Sign up for [employer]. We know it's scary to hear that you have COVID-19. We want to track your symptoms to make sure you're okay over the next 2 weeks. Please look for an email from [employer]--this is a free, online program that we'll  use to keep in touch. To sign up, follow the link in the email. Learn more at [email or web address].    Where can I get more information?    [facility]: [email or web address]    Coronavirus Basics: www.health.[facility].[place]/diseases/coronavirus/basics.html    What to Do If You're Sick: www.cdc.gov/coronavirus/2019-ncov/about/steps-when-sick.html    Ending Home Isolation: www.cdc.gov/coronavirus/2019-ncov/hcp/disposition-in-home-patients.html     Caring for Someone with COVID-19: www.cdc.gov/coronavirus/2019-ncov/if-you-are-sick/care-for-someone.html     [facility] clinical trials (COVID-19 research studies): clinicalaffairs.[facility]-clinical-trials     A Positive COVID-19 letter will be sent via [facility] or the mail. (Exception, no letters sent to Presurgerical/Preprocedure Patients)    Tabby Paredes LPN

## 2022-01-13 NOTE — TELEPHONE ENCOUNTER
Coronavirus (COVID-19) Notification     Reason for call  Patient requesting results     Lab Result    Lab test 2019-nCoV rRt-PCR in process        RN Recommendations/Instructions per [facility]  Continue to quarantine and follow the instructions given at your testing visit until you receive the results.     Please Contact your PCP clinic or return to the Emergency department if your:    Symptoms worsen or other concerning symptom's.     Patient informed that if test for COVID19 is POSITIVE,  you will receive a call typically within 48 hours from the test date (date lab collected).  If NEGATIVE result, you will receive a letter in the mail or MyChart.      Ira Conley LPN

## 2022-02-11 ENCOUNTER — MYC MEDICAL ADVICE (OUTPATIENT)
Dept: FAMILY MEDICINE | Facility: CLINIC | Age: 82
End: 2022-02-11
Payer: COMMERCIAL

## 2022-02-20 ENCOUNTER — HEALTH MAINTENANCE LETTER (OUTPATIENT)
Age: 82
End: 2022-02-20

## 2022-09-12 ASSESSMENT — ENCOUNTER SYMPTOMS
EYE PAIN: 0
FEVER: 0
WEAKNESS: 0
HEARTBURN: 0
MYALGIAS: 0
DYSURIA: 0
DIZZINESS: 0
NERVOUS/ANXIOUS: 0
CHILLS: 0
BREAST MASS: 0
CONSTIPATION: 0
FREQUENCY: 0
PARESTHESIAS: 0
HEMATURIA: 0
COUGH: 0
NAUSEA: 0
DIARRHEA: 0
PALPITATIONS: 0
ARTHRALGIAS: 0
ABDOMINAL PAIN: 0
HEADACHES: 0
SORE THROAT: 0
SHORTNESS OF BREATH: 0
JOINT SWELLING: 0
HEMATOCHEZIA: 0

## 2022-09-12 ASSESSMENT — ACTIVITIES OF DAILY LIVING (ADL): CURRENT_FUNCTION: NO ASSISTANCE NEEDED

## 2022-09-15 ENCOUNTER — OFFICE VISIT (OUTPATIENT)
Dept: FAMILY MEDICINE | Facility: CLINIC | Age: 82
End: 2022-09-15
Payer: COMMERCIAL

## 2022-09-15 VITALS
RESPIRATION RATE: 15 BRPM | SYSTOLIC BLOOD PRESSURE: 130 MMHG | TEMPERATURE: 98 F | OXYGEN SATURATION: 97 % | WEIGHT: 116.8 LBS | HEART RATE: 65 BPM | BODY MASS INDEX: 19.94 KG/M2 | HEIGHT: 64 IN | DIASTOLIC BLOOD PRESSURE: 60 MMHG

## 2022-09-15 DIAGNOSIS — Z13.6 CARDIOVASCULAR SCREENING; LDL GOAL LESS THAN 130: ICD-10-CM

## 2022-09-15 DIAGNOSIS — Z00.00 ENCOUNTER FOR MEDICARE ANNUAL WELLNESS EXAM: Primary | ICD-10-CM

## 2022-09-15 DIAGNOSIS — Z23 NEED FOR COVID-19 VACCINE: ICD-10-CM

## 2022-09-15 DIAGNOSIS — M20.41 HAMMER TOE OF RIGHT FOOT: ICD-10-CM

## 2022-09-15 DIAGNOSIS — I45.9 SKIPPED HEART BEATS: ICD-10-CM

## 2022-09-15 DIAGNOSIS — Z23 NEEDS FLU SHOT: ICD-10-CM

## 2022-09-15 DIAGNOSIS — Z12.11 SCREEN FOR COLON CANCER: ICD-10-CM

## 2022-09-15 DIAGNOSIS — M21.611 BUNION, RIGHT: ICD-10-CM

## 2022-09-15 PROBLEM — M67.472 GANGLION CYST OF BOTH FEET: Chronic | Status: ACTIVE | Noted: 2019-10-08

## 2022-09-15 PROBLEM — M67.471 GANGLION CYST OF BOTH FEET: Chronic | Status: ACTIVE | Noted: 2019-10-08

## 2022-09-15 PROCEDURE — 91312 COVID-19,PF,PFIZER BOOSTER BIVALENT: CPT | Performed by: FAMILY MEDICINE

## 2022-09-15 PROCEDURE — 0124A COVID-19,PF,PFIZER BOOSTER BIVALENT: CPT | Performed by: FAMILY MEDICINE

## 2022-09-15 PROCEDURE — 90662 IIV NO PRSV INCREASED AG IM: CPT | Performed by: FAMILY MEDICINE

## 2022-09-15 PROCEDURE — G0439 PPPS, SUBSEQ VISIT: HCPCS | Performed by: FAMILY MEDICINE

## 2022-09-15 PROCEDURE — 93000 ELECTROCARDIOGRAM COMPLETE: CPT | Performed by: FAMILY MEDICINE

## 2022-09-15 PROCEDURE — G0008 ADMIN INFLUENZA VIRUS VAC: HCPCS | Performed by: FAMILY MEDICINE

## 2022-09-15 PROCEDURE — 99213 OFFICE O/P EST LOW 20 MIN: CPT | Mod: 25 | Performed by: FAMILY MEDICINE

## 2022-09-15 ASSESSMENT — ENCOUNTER SYMPTOMS
CHILLS: 0
HEARTBURN: 0
DIARRHEA: 0
FREQUENCY: 0
HEMATURIA: 0
JOINT SWELLING: 0
SORE THROAT: 0
BREAST MASS: 0
NERVOUS/ANXIOUS: 0
EYE PAIN: 0
ABDOMINAL PAIN: 0
MYALGIAS: 0
HEADACHES: 0
DIZZINESS: 0
PARESTHESIAS: 0
PALPITATIONS: 0
HEMATOCHEZIA: 0
COUGH: 0
WEAKNESS: 0
ARTHRALGIAS: 0
CONSTIPATION: 0
DYSURIA: 0
FEVER: 0
SHORTNESS OF BREATH: 0
NAUSEA: 0

## 2022-09-15 ASSESSMENT — ACTIVITIES OF DAILY LIVING (ADL): CURRENT_FUNCTION: NO ASSISTANCE NEEDED

## 2022-09-15 NOTE — PATIENT INSTRUCTIONS
Patient Education   Personalized Prevention Plan  You are due for the preventive services outlined below.  Your care team is available to assist you in scheduling these services.  If you have already completed any of these items, please share that information with your care team to update in your medical record.  Health Maintenance Due   Topic Date Due     Zoster (Shingles) Vaccine (2 of 3) 11/12/2012     Cholesterol Lab  07/16/2022     ANNUAL REVIEW OF HM ORDERS  07/16/2022     Flu Vaccine (1) 09/01/2022     Preventive Health Recommendations    See your health care provider every year to    Review health changes.     Discuss preventive care.      Review your medicines if your doctor has prescribed any.    You no longer need a yearly Pap test unless you've had an abnormal Pap test in the past 10 years. If you have vaginal symptoms, such as bleeding or discharge, be sure to talk with your provider about a Pap test.    Every 1 to 2 years, have a mammogram.  If you are over 69, talk with your health care provider about whether or not you want to continue having screening mammograms.    Every 10 years, have a colonoscopy. Or, have a yearly FIT test (stool test). These exams will check for colon cancer.     Have a cholesterol test every 5 years, or more often if your doctor advises it.     Have a diabetes test (fasting glucose) every three years. If you are at risk for diabetes, you should have this test more often.     At age 65, have a bone density scan (DEXA) to check for osteoporosis (brittle bone disease).    Shots:    Get a flu shot each year.    Get a tetanus shot every 10 years.    Talk to your doctor about your pneumonia vaccines. There are now two you should receive - Pneumovax (PPSV 23) and Prevnar (PCV 13).    Talk to your pharmacist about the shingles vaccine.    Talk to your doctor about the hepatitis B vaccine.    Nutrition:     Eat at least 5 servings of fruits and vegetables each day.    Eat  whole-grain bread, whole-wheat pasta and brown rice instead of white grains and rice.    Get adequate Calcium and Vitamin D.     Lifestyle    Exercise at least 150 minutes a week (30 minutes a day, 5 days a week). This will help you control your weight and prevent disease.    Limit alcohol to one drink per day.    No smoking.     Wear sunscreen to prevent skin cancer.     See your dentist twice a year for an exam and cleaning.    See your eye doctor every 1 to 2 years to screen for conditions such as glaucoma, macular degeneration and cataracts.    Personalized Prevention Plan  You are due for the preventive services outlined below.  Your care team is available to assist you in scheduling these services.  If you have already completed any of these items, please share that information with your care team to update in your medical record.  Health Maintenance   Topic Date Due     ZOSTER IMMUNIZATION (2 of 3) 11/12/2012     LIPID  07/16/2022     ANNUAL REVIEW OF HM ORDERS  07/16/2022     INFLUENZA VACCINE (1) 09/01/2022     DEXA  10/29/2022     NICOTINE/TOBACCO CESSATION COUNSELING Q 1 YR  09/15/2023     MEDICARE ANNUAL WELLNESS VISIT  09/15/2023     FALL RISK ASSESSMENT  09/15/2023     DTAP/TDAP/TD IMMUNIZATION (3 - Td or Tdap) 09/29/2026     ADVANCE CARE PLANNING  09/15/2027     PHQ-2 (once per calendar year)  Completed     Pneumococcal Vaccine: 65+ Years  Completed     COVID-19 Vaccine  Completed     IPV IMMUNIZATION  Aged Out     MENINGITIS IMMUNIZATION  Aged Out     HEPATITIS B IMMUNIZATION  Aged Out       Understanding USDA MyPlate  The USDA has guidelines to help you make healthy food choices. These are called MyPlate. MyPlate shows the food groups that make up healthy meals using the image of a place setting. Before you eat, think about the healthiest choices for what to put on your plate or in your cup or bowl. To learn more about building a healthy plate, visit www.choosemyplate.gov.    The food  groups    Fruits. Any fruit or 100% fruit juice counts as part of the Fruit Group. Fruits may be fresh, canned, frozen, or dried, and may be whole, cut-up, or pureed. Make 1/2 of your plate fruits and vegetables.    Vegetables. Any vegetable or 100% vegetable juice counts as a member of the Vegetable Group. Vegetables may be fresh, frozen, canned, or dried. They can be served raw or cooked and may be whole, cut-up, or mashed. Make 1/2 of your plate fruits and vegetables.    Grains. All foods made from grains are part of the Grains Group. These include wheat, rice, oats, cornmeal, and barley. Grains are often used to make foods such as bread, pasta, oatmeal, cereal, tortillas, and grits. Grains should be no more than 1/4 of your plate. At least half of your grains should be whole grains.    Protein. This group includes meat, poultry, seafood, beans and peas, eggs, processed soy products (such as tofu), nuts (including nut butters), and seeds. Make protein choices no more than 1/4 of your plate. Meat and poultry choices should be lean or low fat.    Dairy. The Dairy Group includes all fluid milk products and foods made from milk that contain calcium, such as yogurt and cheese. (Foods that have little calcium, such as cream, butter, and cream cheese, are not part of this group.) Most dairy choices should be low-fat or fat-free.    Oils. Oils aren't a food group, but they do contain essential nutrients. However it's important to watch your intake of oils. These are fats that are liquid at room temperature. They include canola, corn, olive, soybean, vegetable, and sunflower oil. Foods that are mainly oil include mayonnaise, certain salad dressings, and soft margarines. You likely already get your daily oil allowance from the foods you eat.  Things to limit  Eating healthy also means limiting these things in your diet:       Salt (sodium). Many processed foods have a lot of sodium. To keep sodium intake down, eat fresh  vegetables, meats, poultry, and seafood when possible. Purchase low-sodium, reduced-sodium, or no-salt-added food products at the store. And don't add salt to your meals at home. Instead, season them with herbs and spices such as dill, oregano, cumin, and paprika. Or try adding flavor with lemon or lime zest and juice.    Saturated fat. Saturated fats are most often found in animal products such as beef, pork, and chicken. They are often solid at room temperature, such as butter. To reduce your saturated fat intake, choose leaner cuts of meat and poultry. And try healthier cooking methods such as grilling, broiling, roasting, or baking. For a simple lower-fat swap, use plain nonfat yogurt instead of mayonnaise when making potato salad or macaroni salad.    Added sugars. These are sugars added to foods. They are in foods such as ice cream, candy, soda, fruit drinks, sports drinks, energy drinks, cookies, pastries, jams, and syrups. Cut down on added sugars by sharing sweet treats with a family member or friend. You can also choose fruit for dessert, and drink water or other unsweetened beverages.     [employer] last reviewed this educational content on 6/1/2020 2000-2021 [employer]. All rights reserved. This information is not intended as a substitute for professional medical care. Always follow your healthcare professional's instructions.          Signs of Hearing Loss      Hearing much better with one ear can be a sign of hearing loss.   Hearing loss is a problem shared by many people. In fact, it is one of the most common health problems, particularly as people age. Most people age 65 and older have some hearing loss. By age 80, almost everyone does. Hearing loss often occurs slowly over the years. So you may not realize your hearing has gotten worse.  Have your hearing checked  Call your healthcare provider if you:    Have to strain to hear normal conversation    Have to watch other people s faces  very carefully to follow what they re saying    Need to ask people to repeat what they ve said    Often misunderstand what people are saying    Turn the volume of the television or radio up so high that others complain    Feel that people are mumbling when they re talking to you    Find that the effort to hear leaves you feeling tired and irritated    Notice, when using the phone, that you hear better with one ear than the other  [employer] last reviewed this educational content on 1/1/2020 2000-2021 [employer]. All rights reserved. This information is not intended as a substitute for professional medical care. Always follow your healthcare professional's instructions.          Urinary Incontinence, Female (Adult)   Urinary incontinence means loss of bladder control. This problem affects many women, especially as they get older. If you have incontinence, you may be embarrassed to ask for help. But know that this problem can be treated.   Types of Incontinence  There are different types of incontinence. Two of the main types are described here. You can have more than one type.     Stress incontinence. With this type, urine leaks when pressure (stress) is put on the bladder. This may happen when you cough, sneeze, or laugh. Stress incontinence most often occurs because the pelvic floor muscles that support the bladder and urethra are weak. This can happen after pregnancy and vaginal childbirth or a hysterectomy. It can also be due to excess body weight or hormone changes.    Urge incontinence (also called overactive bladder). With this type, a sudden urge to urinate is felt often. This may happen even though there may not be much urine in the bladder. The need to urinate often during the night is common. Urge incontinence most often occurs because of bladder spasms. This may be due to bladder irritation or infection. Damage to bladder nerves or pelvic muscles, constipation, and certain medicines can  also lead to urge incontinence.  Treatment depends on the cause. Further evaluation is needed to find the type you have. This will likely include an exam and certain tests. Based on the results, you and your healthcare provider can then plan treatment. Until a diagnosis is made, the home care tips below can help ease symptoms.   Home care    Do pelvic floor muscle exercises, if they are prescribed. The pelvic floor muscles help support the bladder and urethra. Many women find that their symptoms improve when doing special exercises that strengthen these muscles. To do the exercises, contract the muscles you would use to stop your stream of urine. But do this when you re not urinating. Hold for 10 seconds, then relax. Repeat 10 to 20 times in a row, at least 3 times a day. Your healthcare provider may give you other instructions for how to do the exercises and how often.    Keep a bladder diary. This helps track how often and how much you urinate over a set period of time. Bring this diary with you to your next visit with the provider. The information can help your provider learn more about your bladder problem.    Lose weight, if advised to by your provider. Extra weight puts pressure on the bladder. Your provider can help you create a weight-loss plan that s right for you. This may include exercising more and making certain diet changes.    Don't have foods and drinks that may irritate the bladder. These can include alcohol and caffeinated drinks.    Quit smoking. Smoking and other tobacco use can lead to a long-term (chronic) cough that strains the pelvic floor muscles. Smoking may also damage the bladder and urethra. Talk with your provider about treatments or methods you can use to quit smoking.    If drinking large amounts of fluid makes you have symptoms, you may be advised to limit your fluid intake. You may also be advised to drink most of your fluids during the day and to limit fluids at night.    If you re  worried about urine leakage or accidents, you may wear absorbent pads to catch urine. Change the pads often. This helps reduce discomfort. It may also reduce the risk of skin or bladder infections.    Follow-up care  Follow up with your healthcare provider, or as directed. It may take some to find the right treatment for your problem. But healthy lifestyle changes can be made right away. These include such things as exercising on a regular basis, eating a healthy diet, losing weight (if needed), and quitting smoking. Your treatment plan may include special therapies or medicines. Certain procedures or surgery may also be options. Talk about any questions you have with your provider.   When to seek medical advice  Call the healthcare provider right away if any of these occur:    Fever of 100.4 F (38 C) or higher, or as directed by your provider    Bladder pain or fullness    Belly swelling    Nausea or vomiting    Back pain    Weakness, dizziness, or fainting  David last reviewed this educational content on 1/1/2020 2000-2021 [employer]. All rights reserved. This information is not intended as a substitute for professional medical care. Always follow your healthcare professional's instructions.

## 2022-09-15 NOTE — PROGRESS NOTES
"SUBJECTIVE:   Nadiya is a 82 year old who presents for Preventive Visit.    Patient has been advised of split billing requirements and indicates understanding: Yes  Are you in the first 12 months of your Medicare coverage?  No    Healthy Habits:     In general, how would you rate your overall health?  Excellent    Frequency of exercise:  4-5 days/week    Duration of exercise:  Less than 15 minutes    Do you usually eat at least 4 servings of fruit and vegetables a day, include whole grains    & fiber and avoid regularly eating high fat or \"junk\" foods?  No    Taking medications regularly:  Yes    Medication side effects:  None    Ability to successfully perform activities of daily living:  No assistance needed    Home Safety:  No safety concerns identified    Hearing Impairment:  Difficulty understanding speech on the telephone    In the past 6 months, have you been bothered by leaking of urine? Yes    In general, how would you rate your overall mental or emotional health?  Good      PHQ-2 Total Score: 1    Additional concerns today:  No    Do you feel safe in your environment? Yes    Have you ever done Advance Care Planning? (For example, a Health Directive, POLST, or a discussion with a medical provider or your loved ones about your wishes): No, advance care planning information given to patient to review.  Patient declined advance care planning discussion at this time.       Fall risk  Fallen 2 or more times in the past year?: No  Any fall with injury in the past year?: No    Cognitive Screening   1) Repeat 3 items (Leader, Season, Table)    2) Clock draw: NORMAL  3) 3 item recall: Recalls 2 objects   Results: NORMAL clock, 1-2 items recalled: COGNITIVE IMPAIRMENT LESS LIKELY    Mini-CogTM Copyright PARVIZ Rodriguez. Licensed by the author for use in [facility]; reprinted with permission (faby@.[facility]). All rights reserved.      Do you have sleep apnea, excessive snoring or daytime drowsiness?: no    Reviewed " and updated as needed this visit by clinical staff   Tobacco  Allergies  Meds   Med Hx  Surg Hx  Fam Hx  Soc Hx          Reviewed and updated as needed this visit by Provider        Surg Hx  Fam Hx           Social History     Tobacco Use     Smoking status: Current Every Day Smoker     Packs/day: 0.25     Years: 40.00     Pack years: 10.00     Types: Cigarettes     Start date: 1/1/1965     Smokeless tobacco: Never Used     Tobacco comment: 2 pks. weekly   Substance Use Topics     Alcohol use: No     Alcohol/week: 0.0 standard drinks         Alcohol Use 9/12/2022   Prescreen: >3 drinks/day or >7 drinks/week? Not Applicable   Prescreen: >3 drinks/day or >7 drinks/week? -       Current providers sharing in care for this patient include:   Patient Care Team:  Carmen Beckett MD as PCP - General (Family Practice)  Carmen Beckett MD as Assigned PCP    The following health maintenance items are reviewed in Epic and correct as of today:  Health Maintenance   Topic Date Due     ZOSTER IMMUNIZATION (2 of 3) 11/12/2012     LIPID  07/16/2022     ANNUAL REVIEW OF HM ORDERS  07/16/2022     INFLUENZA VACCINE (1) 09/01/2022     DEXA  10/29/2022     NICOTINE/TOBACCO CESSATION COUNSELING Q 1 YR  09/15/2023     MEDICARE ANNUAL WELLNESS VISIT  09/15/2023     FALL RISK ASSESSMENT  09/15/2023     DTAP/TDAP/TD IMMUNIZATION (3 - Td or Tdap) 09/29/2026     ADVANCE CARE PLANNING  09/15/2027     PHQ-2 (once per calendar year)  Completed     Pneumococcal Vaccine: 65+ Years  Completed     COVID-19 Vaccine  Completed     IPV IMMUNIZATION  Aged Out     MENINGITIS IMMUNIZATION  Aged Out     HEPATITIS B IMMUNIZATION  Aged Out     BP Readings from Last 3 Encounters:   09/15/22 130/60   07/16/21 131/69   11/25/20 118/66    Wt Readings from Last 3 Encounters:   09/15/22 53 kg (116 lb 12.8 oz)   07/16/21 55.8 kg (123 lb)   11/25/20 54 kg (119 lb)                  Current Outpatient Medications   Medication Sig Dispense Refill      Multiple Vitamins-Minerals (OCUVITE PO) Eye vitamins daily       UNABLE TO FIND OcuviteMEDICATION NAME: Ocuvite       UNABLE TO FIND MEDICATION NAME: Move Free       Allergies   Allergen Reactions     Alendronic Acid Muscle Pain (Myalgia)     Recent Labs   Lab Test 07/16/21  1122 10/08/19  0950 09/29/16  0836   * 123* 126*   HDL 67 66 66   TRIG 65 41 59   ALT 23  --   --    CR 0.76  --   --    GFRESTIMATED 74  --   --    POTASSIUM 3.8  --   --       Family History   Problem Relation Age of Onset     Heart Disease Mother      Heart Disease Father      LUNG DISEASE Sister         unclear, asthma like     Colon Cancer Sister 81     Diabetes Type 2  Sister         resolved after colon cancer surgery     Alcohol/Drug Brother         lived in [facility]     Diabetes Type 1 Daughter      Allergies Daughter      Diabetes Type 1 Grandchild       Past Medical History:   Diagnosis Date     Lactose intolerance      TMJ (temporomandibular joint disorder)      Tobacco abuse         Mammogram Screening - Patient over age 75, has elected to discontinue screenings.  Pertinent mammograms are reviewed under the imaging tab.    Review of Systems   Constitutional: Negative for chills and fever.   HENT: Positive for ear pain and hearing loss. Negative for congestion and sore throat.    Eyes: Negative for pain and visual disturbance.   Respiratory: Negative for cough and shortness of breath.    Cardiovascular: Negative for chest pain, palpitations and peripheral edema.   Gastrointestinal: Negative for abdominal pain, constipation, diarrhea, heartburn, hematochezia and nausea.   Breasts:  Negative for tenderness, breast mass and discharge.   Genitourinary: Negative for dysuria, frequency, genital sores, hematuria, pelvic pain, urgency, vaginal bleeding and vaginal discharge.   Musculoskeletal: Negative for arthralgias, joint swelling and myalgias.   Skin: Negative for rash.   Neurological: Negative for dizziness, weakness, headaches  "and paresthesias.   Psychiatric/Behavioral: Negative for mood changes. The patient is not nervous/anxious.        OBJECTIVE:   /60 (BP Location: Left arm, Patient Position: Sitting, Cuff Size: Adult Regular)   Pulse 65   Temp 98  F (36.7  C) (Temporal)   Resp 15   Ht 1.63 m (5' 4.17\")   Wt 53 kg (116 lb 12.8 oz)   SpO2 97%   BMI 19.94 kg/m   Estimated body mass index is 19.94 kg/m  as calculated from the following:    Height as of this encounter: 1.63 m (5' 4.17\").    Weight as of this encounter: 53 kg (116 lb 12.8 oz).  Physical Exam  GENERAL: healthy, alert, no distress, frail and elderly  EYES: Eyes grossly normal to inspection, PERRL and conjunctivae and sclerae normal  HENT: ear canals and TM's normal, nose and mouth without ulcers or lesions  NECK: no adenopathy, no asymmetry, masses, or scars and thyroid normal to palpation  RESP: lungs clear to auscultation - no rales, rhonchi or wheezes  CV: regular rate and rhythm, normal S1 S2, no S3 or S4, no murmur, click or rub, no peripheral edema and peripheral pulses strong  ABDOMEN: soft, nontender, no hepatosplenomegaly, no masses and bowel sounds normal  MS: no gross musculoskeletal defects noted, no edema  SKIN: no suspicious lesions or rashes  NEURO: Normal strength and tone, mentation intact and speech normal  PSYCH: mentation appears normal, affect normal/bright      ASSESSMENT / PLAN:   (Z00.00) Encounter for Medicare annual wellness exam  (primary encounter diagnosis)  Comment: routine      (I45.9) Skipped heart beats  Comment: new problem noted today, with normal EKG  Mild bradycardia noted.  Recommend contacting me if pulse in low 50's or 40's.  Plan: EKG 12-lead complete w/read - Clinics            (M20.41) Hammer toe of right foot  Comment: worsening, she would like referral to podiatry.  Plan: Orthopedic  Referral            (M21.071) Bunion, right  Comment: with hx of left bunionectomy  Plan: she would like surgical correction, " "refer to podiatry    (Z23) Need for COVID-19 vaccine  Comment: given today  Plan: COVID-19,PF,PFIZER BOOSTER BIVALENT 12+Yrs            (Z13.6) CARDIOVASCULAR SCREENING; LDL GOAL LESS THAN 130  Comment:   Plan: Lipid panel reflex to direct LDL Non-fasting            (Z12.11) Screen for colon cancer  Comment:   Plan: COLOGUARD(EXACT SCIENCES)            (Z23) Needs flu shot  Comment:   Plan: INFLUENZA, QUAD, HD, PF, 65+ (FLUZONE HD)            COUNSELING:  Reviewed preventive health counseling, as reflected in patient instructions    Estimated body mass index is 19.94 kg/m  as calculated from the following:    Height as of this encounter: 1.63 m (5' 4.17\").    Weight as of this encounter: 53 kg (116 lb 12.8 oz).        She reports that she has been smoking cigarettes. She started smoking about 57 years ago. She has a 10.00 pack-year smoking history. She has never used smokeless tobacco.  Nicotine/Tobacco Cessation Plan:   Information offered: Patient not interested at this time      Appropriate preventive services were discussed with this patient, including applicable screening as appropriate for cardiovascular disease, diabetes, osteopenia/osteoporosis, and glaucoma.  As appropriate for age/gender, discussed screening for colorectal cancer, prostate cancer, breast cancer, and cervical cancer. Checklist reviewing preventive services available has been given to the patient.    Reviewed patients plan of care and provided an AVS. The Intermediate Care Plan ( asthma action plan, low back pain action plan, and migraine action plan) for Nadiya meets the Care Plan requirement. This Care Plan has been established and reviewed with the Patient.    Counseling Resources:  ATP IV Guidelines  Pooled Cohorts Equation Calculator  Breast Cancer Risk Calculator  Breast Cancer: Medication to Reduce Risk  FRAX Risk Assessment  ICSI Preventive Guidelines  Dietary Guidelines for Americans, 2010  USDA's MyPlate  ASA Prophylaxis  Lung CA " Screening    Carmen Beckett MD  [facility]    Identified Health Risks:

## 2022-09-15 NOTE — RESULT ENCOUNTER NOTE
Patient was seen today in clinic.  I discussed results in clinic, please see clinic progress note.    Carmen Beckett MD 9/15/2022

## 2022-09-15 NOTE — PROGRESS NOTES
"SUBJECTIVE:   Nadiya is a 82 year old who presents for Preventive Visit.    {Split Bill scripting  The purpose of this visit is to discuss your medical history and prevent health problems before you are sick. You may be responsible for a co-pay, coinsurance, or deductible if your visit today includes services such as checking on a sore throat, having an x-ray or lab test, or treating and evaluating a new or existing condition :847080}  {Patient advised of split billing (Optional):603884}  Are you in the first 12 months of your Medicare coverage?  { :383199::\"No\"}    Healthy Habits:     In general, how would you rate your overall health?  Excellent    Frequency of exercise:  4-5 days/week    Duration of exercise:  Less than 15 minutes    Do you usually eat at least 4 servings of fruit and vegetables a day, include whole grains    & fiber and avoid regularly eating high fat or \"junk\" foods?  No    Taking medications regularly:  Yes    Medication side effects:  None    Ability to successfully perform activities of daily living:  No assistance needed    Home Safety:  No safety concerns identified    Hearing Impairment:  Difficulty understanding speech on the telephone    In the past 6 months, have you been bothered by leaking of urine? Yes    In general, how would you rate your overall mental or emotional health?  Good      PHQ-2 Total Score: 1    Additional concerns today:  No    Do you feel safe in your environment? { :512863}    Have you ever done Advance Care Planning? (For example, a Health Directive, POLST, or a discussion with a medical provider or your loved ones about your wishes): { :779451}    {Hearing Test Done (Optional):803824}   Fall risk  { :775823}  {If any of the above assessments are answered yes, consider ordering appropriate referrals (Optional):791087::\"click delete button to remove this line now\"}  Cognitive Screening { :598084}    {Do you have sleep apnea, excessive snoring or daytime drowsiness? " (Optional):013781}    Reviewed and updated as needed this visit by clinical staff                    Reviewed and updated as needed this visit by Provider                   Social History     Tobacco Use     Smoking status: Current Every Day Smoker     Packs/day: 0.25     Years: 40.00     Pack years: 10.00     Types: Cigarettes     Start date: 1/1/1965     Smokeless tobacco: Never Used     Tobacco comment: 2 pks. weekly   Substance Use Topics     Alcohol use: No     Alcohol/week: 0.0 standard drinks     {Rooming Staff- Complete this question if Prescreen response is not shown below for today's visit. If you drink alcohol do you typically have >3 drinks per day or >7 drinks per week? (Optional):118992}    Alcohol Use 9/12/2022   Prescreen: >3 drinks/day or >7 drinks/week? Not Applicable   Prescreen: >3 drinks/day or >7 drinks/week? -   {add AUDIT responses (Optional) (A score of 7 for adult men is an indication of hazardous drinking; a score of 8 or more is an indication of an alcohol use disorder.  A score of 7 or more for adult women is an indication of hazardous drinking or an alchohol use disorder):980068}    {Outside tests to abstract? :208824}    {additional problems to add (Optional):621691}    Current providers sharing in care for this patient include: {Rooming staff:  Please update Care Team in Rooming Activity, refresh this note and then delete this statement}  Patient Care Team:  Carmen Beckett MD as PCP - General (Family Practice)  Carmen Beckett MD as Assigned PCP    The following health maintenance items are reviewed in Epic and correct as of today:  Health Maintenance   Topic Date Due     ZOSTER IMMUNIZATION (2 of 3) 11/12/2012     MEDICARE ANNUAL WELLNESS VISIT  10/08/2020     LIPID  07/16/2022     ANNUAL REVIEW OF HM ORDERS  07/16/2022     INFLUENZA VACCINE (1) 09/01/2022     DEXA  10/29/2022     FALL RISK ASSESSMENT  09/15/2023     ADVANCE CARE PLANNING  10/08/2024     DTAP/TDAP/TD  "IMMUNIZATION (3 - Td or Tdap) 2026     PHQ-2 (once per calendar year)  Completed     Pneumococcal Vaccine: 65+ Years  Completed     COVID-19 Vaccine  Completed     IPV IMMUNIZATION  Aged Out     MENINGITIS IMMUNIZATION  Aged Out     HEPATITIS B IMMUNIZATION  Aged Out     {Chronicprobdata (optional):244600}  {Decision Support (Optional):064912}    {Mammo DS 75+ :734419}  Pertinent mammograms are reviewed under the imaging tab.    Review of Systems   Constitutional: Negative for chills and fever.   HENT: Positive for ear pain and hearing loss. Negative for congestion and sore throat.    Eyes: Negative for pain and visual disturbance.   Respiratory: Negative for cough and shortness of breath.    Cardiovascular: Negative for chest pain, palpitations and peripheral edema.   Gastrointestinal: Negative for abdominal pain, constipation, diarrhea, heartburn, hematochezia and nausea.   Breasts:  Negative for tenderness, breast mass and discharge.   Genitourinary: Negative for dysuria, frequency, genital sores, hematuria, pelvic pain, urgency, vaginal bleeding and vaginal discharge.   Musculoskeletal: Negative for arthralgias, joint swelling and myalgias.   Skin: Negative for rash.   Neurological: Negative for dizziness, weakness, headaches and paresthesias.   Psychiatric/Behavioral: Negative for mood changes. The patient is not nervous/anxious.      {ROS COMP (Optional):919830}    OBJECTIVE:   There were no vitals taken for this visit. Estimated body mass index is 20.47 kg/m  as calculated from the following:    Height as of 10/8/19: 1.651 m (5' 5\").    Weight as of 21: 55.8 kg (123 lb).  Physical Exam  {Exam (Optional) :159313}    {Diagnostic Test Results (Optional):165980::\"Diagnostic Test Results:\",\"Labs reviewed in Epic\"}    ASSESSMENT / PLAN:   {Diag Picklist:566548}    {Patient advised of split billing (Optional):909892}    COUNSELING:  {Medicare Counselin}    Estimated body mass index is 20.47 kg/m  " "as calculated from the following:    Height as of 10/8/19: 1.651 m (5' 5\").    Weight as of 7/16/21: 55.8 kg (123 lb).    {Weight Management Plan (ACO) Complete if BMI is abnormal-  Ages 18-64  BMI >24.9.  Age 65+ with BMI <23 or >30 (Optional):137029}    She reports that she has been smoking cigarettes. She started smoking about 57 years ago. She has a 10.00 pack-year smoking history. She has never used smokeless tobacco.  Nicotine/Tobacco Cessation Plan:   {Nicotine/Tobacco Cessation Plan:957173}      Appropriate preventive services were discussed with this patient, including applicable screening as appropriate for cardiovascular disease, diabetes, osteopenia/osteoporosis, and glaucoma.  As appropriate for age/gender, discussed screening for colorectal cancer, prostate cancer, breast cancer, and cervical cancer. Checklist reviewing preventive services available has been given to the patient.    Reviewed patients plan of care and provided an AVS. The {CarePlan:905607} for Nadiya meets the Care Plan requirement. This Care Plan has been established and reviewed with the {PATIENT, FAMILY MEMBER, CAREGIVER:845310}.    Counseling Resources:  ATP IV Guidelines  Pooled Cohorts Equation Calculator  Breast Cancer Risk Calculator  Breast Cancer: Medication to Reduce Risk  FRAX Risk Assessment  ICSI Preventive Guidelines  Dietary Guidelines for Americans, 2010  USDA's MyPlate  ASA Prophylaxis  Lung CA Screening    Carmen Beckett MD  [facility]    Identified Health Risks:  "

## 2022-09-16 ENCOUNTER — VIRTUAL VISIT (OUTPATIENT)
Dept: FAMILY MEDICINE | Facility: CLINIC | Age: 82
End: 2022-09-16
Payer: COMMERCIAL

## 2022-09-16 DIAGNOSIS — G47.00 INSOMNIA, UNSPECIFIED TYPE: Primary | ICD-10-CM

## 2022-09-16 PROCEDURE — 99212 OFFICE O/P EST SF 10 MIN: CPT | Mod: CS | Performed by: INTERNAL MEDICINE

## 2022-09-16 NOTE — PROGRESS NOTES
Nadiya is a 82 year old who is being evaluated via a billable telephone visit.      What phone number would you like to be contacted at?  [phone]  How would you like to obtain your AVS? MyChart    Assessment & Plan     Insomnia, unspecified type  Restless last night.  [place] something was off, prompting home covid test.     In verifying with the patient, over the phone, it comes to light that she likely misinterpreted her home covid test.  She notes a positive line over control only.     She will take an additional test.  We discussed how to read the test results.  She will call back if true positive.          No follow-ups on file.    Kenroy Wade MD  [facility]   Nadiya is a 82 year old, presenting for the following health issues:  Covid Concern    [place] restless last night.  No objective fever.  Had preventive care visit just yesterday.  Feels tired this morning but otherwise fine.    Took Covid test as she has plans this weekend.      HPI     COVID-19 Symptom Review - Covid Positive 9/16/2022  How many days ago did these symptoms start? 9/16/2022    Are any of the following symptoms significant for you?    New or worsening difficulty breathing? No    Worsening cough? No    Fever or chills? Yes, I felt feverish or had chills.    Headache: YES- more Dizzy    Sore throat: No    Chest pain: No    Diarrhea: No    Body aches? YES-Fatigue    What treatments has patient tried? None   Does patient live in a nursing home, group home, or shelter? No  Does patient have a way to get food/medications during quarantined? Yes, I have a friend or family member who can help me.        Review of Systems         Objective       Vitals:  No vitals were obtained today due to virtual visit.    Physical Exam   healthy, alert and no distress  PSYCH: Alert and oriented times 3; coherent speech, normal   rate and volume, able to articulate logical thoughts, able   to abstract reason, no tangential  thoughts, no hallucinations   or delusions  Her affect is normal  RESP: No cough, no audible wheezing, able to talk in full sentences  Remainder of exam unable to be completed due to telephone visits                Phone call duration: 13 minutes

## 2022-09-27 LAB — NONINV COLON CA DNA+OCC BLD SCRN STL QL: NEGATIVE

## 2022-09-29 NOTE — RESULT ENCOUNTER NOTE
Great news!  The results of your recent Cologuard stool testing was normal (negative for colon cancer). Congratulations, you do not have colon cancer!      Please repeat this test every 3 years. Certainly if you were to ever develop symptoms that are concerning such as stool changes, change in color of your stool, blood in your stool, very thin stools, or significant weight loss, please let me know.    If you have any questions or concerns, please call the clinic.    Thank you very much for completing the test!      Sincerely,    Carmen Beckett MD

## 2022-12-30 ENCOUNTER — NURSE TRIAGE (OUTPATIENT)
Dept: NURSING | Facility: CLINIC | Age: 82
End: 2022-12-30

## 2022-12-30 NOTE — TELEPHONE ENCOUNTER
Nurse Triage SBAR    Is this a 2nd Level Triage? YES, LICENSED PRACTITIONER REVIEW IS REQUIRED    Situation:   Skipped heart beats    Background:   Patient seen in September with PCP and had EKG done due to feeling of skipped beats. Stated normal EKG with bradycardia. Patient was asymptomatic at the time. Has been noticing more issues the past couple weeks with dizziness and more skipped beats.    Assessment:   Patient denies chest pain or SOB. Patient denies any dizziness at this time, but states she sometimes gets dizzy going up and down stairs and feels more skipped beats during exertion. She has an OTC cardiac monitor that takes B/P and HR and notices that sometimes she's in the high 50's or 90's, but it also reads skipped beats. When she took it for me now it stated she had 8 missed beats in the minute. Patient denies any cardiac symptoms going on at this time.    Protocol Recommended Disposition:   See in Office Today    Recommendation:   Will transfer  Patient to see if there are any appointments today left, but due to the likehood not, that I will route message to care team and provider high priority to see next steps. I instructed to patient that if she doesn't hear back by the end of the business day for the clinic (6PM), to give us a call back at the nurse triage line to see what we can do next. Patient given education on caffeine and smoking use. Patient understands plan and will follow through. Patient warm transferred to .    FLORENCIO GRIMES RN on 12/30/2022 at 3:05 PM    Routed to provider    Does the patient meet one of the following criteria for ADS visit consideration? 16+ years old, with an [facility] PCP     TIP  Providers, please consider if this condition is appropriate for management at one of our Acute and Diagnostic Services sites.     If patient is a good candidate, please use dotphrase <dot>triageresponse and select Refer to ADS to document.    FLORENCIO GRIMES RN on 12/30/2022 at 3:05  PM      Reason for Disposition    Skipped or extra beat(s) and occurs 4 or more times per minute    Additional Information    Negative: Passed out (i.e., lost consciousness, collapsed and was not responding)    Negative: Shock suspected (e.g., cold/pale/clammy skin, too weak to stand, low BP, rapid pulse)    Negative: Difficult to awaken or acting confused (e.g., disoriented, slurred speech)    Negative: Visible sweat on face or sweat dripping down face    Negative: Unable to walk, or can only walk with assistance (e.g., requires support)    Negative: Received SHOCK from implantable cardiac defibrillator and has persisting symptoms (i.e., palpitations, lightheadedness)    Negative: Dizziness, lightheadedness, or weakness and heart beating very rapidly (e.g., > 140 / minute)    Negative: Dizziness, lightheadedness, or weakness and heart beating very slowly (e.g., < 50 / minute)    Negative: Sounds like a life-threatening emergency to the triager    Negative: Chest pain    Negative: Difficulty breathing    Negative: Dizziness, lightheadedness, or weakness    Negative: Heart beating very rapidly (e.g., > 140 / minute) and present now  (Exception: During exercise.)    Negative: Heart beating very slowly (e.g., < 50 / minute)  (Exception: Athlete and heart rate normal for caller.)    Negative: New or worsened shortness of breath with activity (dyspnea on exertion)    Negative: Patient sounds very sick or weak to the triager    Negative: Wearing a 'Holter monitor' or 'cardiac event monitor'    Negative: Received SHOCK from implantable cardiac defibrillator (and now feels well)    Negative: Heart beating very rapidly (e.g., > 140 / minute) and not present now  (Exception: During exercise.)    Negative: Skipped or extra beat(s) and increases with exercise or exertion    Protocols used: HEART RATE AND HEARTBEAT [id number]

## 2022-12-30 NOTE — TELEPHONE ENCOUNTER
Provider Response to 2nd Level Triage Request    I have reviewed the RN documentation. My recommendation is:  Refer to ED

## 2022-12-30 NOTE — TELEPHONE ENCOUNTER
This requires a provider 2nd level triage.  Please advise on where pt should be seen. Would UC be ok?  STEVEN Ordonez

## 2023-04-13 ENCOUNTER — OFFICE VISIT (OUTPATIENT)
Dept: FAMILY MEDICINE | Facility: CLINIC | Age: 83
End: 2023-04-13
Payer: COMMERCIAL

## 2023-04-13 VITALS
RESPIRATION RATE: 16 BRPM | OXYGEN SATURATION: 97 % | HEART RATE: 73 BPM | HEIGHT: 63 IN | BODY MASS INDEX: 23.39 KG/M2 | TEMPERATURE: 98.4 F | WEIGHT: 132 LBS | DIASTOLIC BLOOD PRESSURE: 66 MMHG | SYSTOLIC BLOOD PRESSURE: 134 MMHG

## 2023-04-13 DIAGNOSIS — R63.6 UNDERWEIGHT: ICD-10-CM

## 2023-04-13 DIAGNOSIS — I45.9 SKIPPED BEATS: Primary | ICD-10-CM

## 2023-04-13 DIAGNOSIS — Z13.6 CARDIOVASCULAR SCREENING; LDL GOAL LESS THAN 130: ICD-10-CM

## 2023-04-13 LAB
ALBUMIN SERPL BCG-MCNC: 4.3 G/DL (ref 3.5–5.2)
ALP SERPL-CCNC: 67 U/L (ref 35–104)
ALT SERPL W P-5'-P-CCNC: 18 U/L (ref 10–35)
ANION GAP SERPL CALCULATED.3IONS-SCNC: 13 MMOL/L (ref 7–15)
AST SERPL W P-5'-P-CCNC: 32 U/L (ref 10–35)
BASOPHILS # BLD AUTO: 0 10E3/UL (ref 0–0.2)
BASOPHILS NFR BLD AUTO: 1 %
BILIRUB SERPL-MCNC: 0.6 MG/DL
BUN SERPL-MCNC: 13.4 MG/DL (ref 8–23)
CALCIUM SERPL-MCNC: 9.3 MG/DL (ref 8.8–10.2)
CHLORIDE SERPL-SCNC: 106 MMOL/L (ref 98–107)
CHOLEST SERPL-MCNC: 207 MG/DL
CREAT SERPL-MCNC: 0.71 MG/DL (ref 0.51–0.95)
DEPRECATED HCO3 PLAS-SCNC: 21 MMOL/L (ref 22–29)
EOSINOPHIL # BLD AUTO: 0.3 10E3/UL (ref 0–0.7)
EOSINOPHIL NFR BLD AUTO: 5 %
ERYTHROCYTE [DISTWIDTH] IN BLOOD BY AUTOMATED COUNT: 12.1 % (ref 10–15)
GFR SERPL CREATININE-BSD FRML MDRD: 85 ML/MIN/1.73M2
GLUCOSE SERPL-MCNC: 86 MG/DL (ref 70–99)
HCT VFR BLD AUTO: 36.1 % (ref 35–47)
HDLC SERPL-MCNC: 73 MG/DL
HGB BLD-MCNC: 12 G/DL (ref 11.7–15.7)
IMM GRANULOCYTES # BLD: 0 10E3/UL
IMM GRANULOCYTES NFR BLD: 0 %
LDLC SERPL CALC-MCNC: 125 MG/DL
LYMPHOCYTES # BLD AUTO: 1.6 10E3/UL (ref 0.8–5.3)
LYMPHOCYTES NFR BLD AUTO: 27 %
MCH RBC QN AUTO: 29.6 PG (ref 26.5–33)
MCHC RBC AUTO-ENTMCNC: 33.2 G/DL (ref 31.5–36.5)
MCV RBC AUTO: 89 FL (ref 78–100)
MONOCYTES # BLD AUTO: 0.6 10E3/UL (ref 0–1.3)
MONOCYTES NFR BLD AUTO: 10 %
NEUTROPHILS # BLD AUTO: 3.3 10E3/UL (ref 1.6–8.3)
NEUTROPHILS NFR BLD AUTO: 57 %
NONHDLC SERPL-MCNC: 134 MG/DL
PLATELET # BLD AUTO: 268 10E3/UL (ref 150–450)
POTASSIUM SERPL-SCNC: 4.1 MMOL/L (ref 3.4–5.3)
PROT SERPL-MCNC: 6.6 G/DL (ref 6.4–8.3)
RBC # BLD AUTO: 4.05 10E6/UL (ref 3.8–5.2)
SODIUM SERPL-SCNC: 140 MMOL/L (ref 136–145)
TRIGL SERPL-MCNC: 47 MG/DL
TSH SERPL DL<=0.005 MIU/L-ACNC: 2.16 UIU/ML (ref 0.3–4.2)
WBC # BLD AUTO: 5.8 10E3/UL (ref 4–11)

## 2023-04-13 PROCEDURE — 85025 COMPLETE CBC W/AUTO DIFF WBC: CPT | Performed by: FAMILY MEDICINE

## 2023-04-13 PROCEDURE — 84443 ASSAY THYROID STIM HORMONE: CPT | Performed by: FAMILY MEDICINE

## 2023-04-13 PROCEDURE — 80061 LIPID PANEL: CPT | Performed by: FAMILY MEDICINE

## 2023-04-13 PROCEDURE — 80053 COMPREHEN METABOLIC PANEL: CPT | Performed by: FAMILY MEDICINE

## 2023-04-13 PROCEDURE — 99214 OFFICE O/P EST MOD 30 MIN: CPT | Performed by: FAMILY MEDICINE

## 2023-04-13 PROCEDURE — 36415 COLL VENOUS BLD VENIPUNCTURE: CPT | Performed by: FAMILY MEDICINE

## 2023-04-13 PROCEDURE — 93000 ELECTROCARDIOGRAM COMPLETE: CPT | Performed by: FAMILY MEDICINE

## 2023-04-13 PROCEDURE — 2894A CBC WITH PLATELETS AND DIFFERENTIAL: CPT | Performed by: FAMILY MEDICINE

## 2023-04-13 PROCEDURE — 84134 ASSAY OF PREALBUMIN: CPT | Performed by: FAMILY MEDICINE

## 2023-04-13 ASSESSMENT — PAIN SCALES - GENERAL: PAINLEVEL: NO PAIN (0)

## 2023-04-13 NOTE — PATIENT INSTRUCTIONS
Results for orders placed or performed in visit on 04/13/23   CBC with platelets and differential     Status: None   Result Value Ref Range    WBC Count 5.8 4.0 - 11.0 10e3/uL    RBC Count 4.05 3.80 - 5.20 10e6/uL    Hemoglobin 12.0 11.7 - 15.7 g/dL    Hematocrit 36.1 35.0 - 47.0 %    MCV 89 78 - 100 fL    MCH 29.6 26.5 - 33.0 pg    MCHC 33.2 31.5 - 36.5 g/dL    RDW 12.1 10.0 - 15.0 %    Platelet Count 268 150 - 450 10e3/uL    % Neutrophils 57 %    % Lymphocytes 27 %    % Monocytes 10 %    % Eosinophils 5 %    % Basophils 1 %    % Immature Granulocytes 0 %    Absolute Neutrophils 3.3 1.6 - 8.3 10e3/uL    Absolute Lymphocytes 1.6 0.8 - 5.3 10e3/uL    Absolute Monocytes 0.6 0.0 - 1.3 10e3/uL    Absolute Eosinophils 0.3 0.0 - 0.7 10e3/uL    Absolute Basophils 0.0 0.0 - 0.2 10e3/uL    Absolute Immature Granulocytes 0.0 <=0.4 10e3/uL   CBC with Platelets & Differential     Status: None    Narrative    The following orders were created for panel order CBC with Platelets & Differential.  Procedure                               Abnormality         Status                     ---------                               -----------         ------                     CBC with platelets and d...[[id number]]                      Final result                 Please view results for these tests on the individual orders.

## 2023-04-13 NOTE — PROGRESS NOTES
Assessment & Plan     (I45.9) Skipped beats  (primary encounter diagnosis)  New, previously undiagnosed problem with uncertain prognosis and additional work-up planned.   Normal/physiologic vs intermittent arrythmia with normal EKG today  No symptoms noted  Will obtain CBC, CMP, TSH, Will fu as indicated.   Consider cardiology referral if symptoms develope    (R63.6) Underweight  Comment: low protein noted previously  Plan: Prealbumin          (Z13.6) CARDIOVASCULAR SCREENING; LDL GOAL LESS THAN 130  Comment:   LDL Cholesterol Calculated   Date Value Ref Range Status   07/16/2021 129 (H) <=100 mg/dL Final     Comment:     Age 0-19 years:  Desirable: 0-110 mg/dL   Borderline high: 110-129 mg/dL   High: >= 130 mg/dL    Age 20 years and older:  Desirable: <100mg/dL  Above desirable: 100-129 mg/dL   Borderline high: 130-159 mg/dL   High: 160-189 mg/dL   Very high: >= 190 mg/dL   10/08/2019 123 (H) <100 mg/dL Final     Comment:     Above desirable:  100-129 mg/dl  Borderline High:  130-159 mg/dL  High:             160-189 mg/dL  Very high:       >189 mg/dl        Plan: previously normal lipid panel    Carmen Laura Beckett MD  [facility]   Nadiya is a 82 year old, presenting for the following health issues:  Skipped heart beats  At her last exam irregularly irregular rhythm noted, apparent skipped beats, with mild bradycardia, but no symptoms of chest pain, palpitations, fatigue, sob or chest pressure.  She reports shoveling snow with no adverse symptoms. She quit smoking 2 months ago!    Heart Problem (Cardiac concerns over past several months per pt)        4/13/2023     1:24 PM   Additional Questions   Roomed by Bhargav LEE RN     Heart Problem    History of Present Illness       Reason for visit:  Check on my heart    She eats 0-1 servings of fruits and vegetables daily.She consumes 0 sweetened beverage(s) daily.She exercises with enough effort to increase her heart rate 10 to  "19 minutes per day.  She exercises with enough effort to increase her heart rate 4 days per week.   She is taking medications regularly.     Review of Systems   Constitutional, HEENT, cardiovascular, pulmonary, GI, , musculoskeletal, neuro, skin, endocrine and psych systems are negative, except as otherwise noted.      Objective    /66 (BP Location: Right arm, Patient Position: Sitting, Cuff Size: Adult Regular)   Pulse 73   Temp 98.4  F (36.9  C) (Temporal)   Resp 16   Ht 1.6 m (5' 2.99\")   Wt 59.9 kg (132 lb)   SpO2 97%   BMI 23.39 kg/m    Body mass index is 23.39 kg/m .  Physical Exam   GENERAL: healthy, alert and no distress  NECK: no adenopathy, no asymmetry, masses, or scars and thyroid normal to palpation  RESP: lungs clear to auscultation - no rales, rhonchi or wheezes  CV: occasional premature beats, normal S1 S2, no S3 or S4, no murmur, click or rub, peripheral pulses strong and no peripheral edema  ABDOMEN: soft, nontender, no hepatosplenomegaly, no masses and bowel sounds normal  MS: no gross musculoskeletal defects noted, no edema    EKG - Reviewed and interpreted by me appears normal, NSR, normal axis, normal intervals, no acute ST/T changes c/w ischemia, no LVH by voltage criteria, unchanged from previous tracings              "

## 2023-04-13 NOTE — RESULT ENCOUNTER NOTE
Patient was seen today in clinic.  I discussed results in clinic, please see clinic progress note.    Carmen Beckett MD 4/13/2023

## 2023-04-14 LAB — PREALB SERPL IA-MCNC: 21 MG/DL (ref 15–45)

## 2023-04-21 NOTE — RESULT ENCOUNTER NOTE
Thank you very much for getting labs done! Everything looks normal/stable. Good job!    Sincerely,  Dr. Carmen Beckett MD  4/20/2023

## 2023-07-14 NOTE — PATIENT INSTRUCTIONS
ACP made aware, will continue to monitor for bleeding/pain Results for orders placed or performed in visit on 07/16/21   CBC with platelets and differential     Status: None   Result Value Ref Range    WBC Count 5.5 4.0 - 11.0 10e3/uL    RBC Count 4.15 3.80 - 5.20 10e6/uL    Hemoglobin 12.4 11.7 - 15.7 g/dL    Hematocrit 36.2 35.0 - 47.0 %    MCV 87 78 - 100 fL    MCH 29.9 26.5 - 33.0 pg    MCHC 34.3 31.5 - 36.5 g/dL    RDW 12.2 10.0 - 15.0 %    Platelet Count 275 150 - 450 10e3/uL    % Neutrophils 54 %    % Lymphocytes 31 %    % Monocytes 8 %    % Eosinophils 5 %    % Basophils 1 %    % Immature Granulocytes 0 %    Absolute Neutrophils 3.0 1.6 - 8.3 10e3/uL    Absolute Lymphocytes 1.7 0.8 - 5.3 10e3/uL    Absolute Monocytes 0.5 0.0 - 1.3 10e3/uL    Absolute Eosinophils 0.3 0.0 - 0.7 10e3/uL    Absolute Basophils 0.1 0.0 - 0.2 10e3/uL    Absolute Immature Granulocytes 0.0 <=0.0 10e3/uL   CBC with Platelets & Differential     Status: None    Narrative    The following orders were created for panel order CBC with Platelets & Differential.  Procedure                               Abnormality         Status                     ---------                               -----------         ------                     CBC with platelets and d...[[id number]]                      Final result                 Please view results for these tests on the individual orders.       no new orders at this time, continuous monitoring remains in place ACP made aware, will continue to monitor for bleeding/pain

## 2023-08-16 ENCOUNTER — PATIENT OUTREACH (OUTPATIENT)
Dept: CARE COORDINATION | Facility: CLINIC | Age: 83
End: 2023-08-16
Payer: COMMERCIAL

## 2023-08-30 ENCOUNTER — PATIENT OUTREACH (OUTPATIENT)
Dept: CARE COORDINATION | Facility: CLINIC | Age: 83
End: 2023-08-30
Payer: COMMERCIAL

## 2023-10-06 ENCOUNTER — OFFICE VISIT (OUTPATIENT)
Dept: FAMILY MEDICINE | Facility: CLINIC | Age: 83
End: 2023-10-06
Payer: COMMERCIAL

## 2023-10-06 VITALS
HEART RATE: 81 BPM | OXYGEN SATURATION: 98 % | WEIGHT: 136 LBS | BODY MASS INDEX: 23.22 KG/M2 | HEIGHT: 64 IN | RESPIRATION RATE: 18 BRPM | TEMPERATURE: 97.3 F | DIASTOLIC BLOOD PRESSURE: 60 MMHG | SYSTOLIC BLOOD PRESSURE: 134 MMHG

## 2023-10-06 DIAGNOSIS — K40.90 INGUINAL HERNIA, LEFT: ICD-10-CM

## 2023-10-06 DIAGNOSIS — S46.812A TRAPEZIUS STRAIN, LEFT, INITIAL ENCOUNTER: ICD-10-CM

## 2023-10-06 DIAGNOSIS — K92.1 MELENA: Primary | ICD-10-CM

## 2023-10-06 LAB
ERYTHROCYTE [DISTWIDTH] IN BLOOD BY AUTOMATED COUNT: 12.2 % (ref 10–15)
HCT VFR BLD AUTO: 37.4 % (ref 35–47)
HGB BLD-MCNC: 12.6 G/DL (ref 11.7–15.7)
MCH RBC QN AUTO: 30.1 PG (ref 26.5–33)
MCHC RBC AUTO-ENTMCNC: 33.7 G/DL (ref 31.5–36.5)
MCV RBC AUTO: 89 FL (ref 78–100)
PLATELET # BLD AUTO: 298 10E3/UL (ref 150–450)
RBC # BLD AUTO: 4.19 10E6/UL (ref 3.8–5.2)
WBC # BLD AUTO: 5.1 10E3/UL (ref 4–11)

## 2023-10-06 PROCEDURE — 36415 COLL VENOUS BLD VENIPUNCTURE: CPT | Performed by: PHYSICIAN ASSISTANT

## 2023-10-06 PROCEDURE — 90662 IIV NO PRSV INCREASED AG IM: CPT | Performed by: PHYSICIAN ASSISTANT

## 2023-10-06 PROCEDURE — 99213 OFFICE O/P EST LOW 20 MIN: CPT | Mod: 25 | Performed by: PHYSICIAN ASSISTANT

## 2023-10-06 PROCEDURE — G0008 ADMIN INFLUENZA VIRUS VAC: HCPCS | Performed by: PHYSICIAN ASSISTANT

## 2023-10-06 PROCEDURE — 85027 COMPLETE CBC AUTOMATED: CPT | Performed by: PHYSICIAN ASSISTANT

## 2023-10-06 ASSESSMENT — PAIN SCALES - GENERAL: PAINLEVEL: NO PAIN (0)

## 2023-10-06 NOTE — PROGRESS NOTES
Assessment & Plan     (K92.1) Melena  (primary encounter diagnosis)  Comment:   Plan: CBC with platelets, Immunos occult blood        Based on patient reporting that her melena has resolved, we will check a CBC and an I fob today.  Patient advised that should either test be abnormal would likely order upper endoscopy plus or minus lower endoscopy based on change in stool size.  Patient advised to avoid NSAIDs or other medications that would upset her stomach    (S46.082A) Trapezius strain, left, initial encounter  Comment:   Plan: Etiology discussed today. Treatment options, and expected course of recovery reviewed. If any worsening of symptoms, please contact the clinical team sooner, otherwise follow up as discussed.  Consider PT in the future if symptoms are not improving, low suspicion for rotator cuff tear today based on fall range of motion    (K40.90) Inguinal hernia, left  Comment:   Plan: Reducible nontender inguinal hernia noted today, patient advised on red flag symptoms to watch for for hernia and when to seek care, otherwise continue conservative management                 Peter Marie PA-C  [facility]    Ritika Hearn is a 83 year old, presenting for the following health issues:  Abdominal Pain      10/6/2023     9:26 AM   Additional Questions   Roomed by Shirley POWER   Accompanied by self       History of Present Illness       Reason for visit:  Abdomin issue  Symptom onset:  More than a month  Symptoms include:  Blood in stool, pain in sholder, sharp pain in hurt  Symptom intensity:  Moderate  Symptom progression:  Staying the same  Had these symptoms before:  No  What makes it worse:  No  What makes it better:  No    She eats 0-1 servings of fruits and vegetables daily.She consumes 1 sweetened beverage(s) daily.She exercises with enough effort to increase her heart rate 30 to 60 minutes per day.  She exercises with enough effort to increase her heart rate 4 days per  "week.   She is taking medications regularly.       Patient noted 2 months ago that she had some dark black tarry stools which lasted 1 week.  She has noted normal bowel movements since then.  Slight change in diameter of her stool, thinner.  Patient has never had a colonoscopy, she did have a negative Cologuard in 2022.  She denies any excessive NSAID use, currently only taking a multivitamin.    Patient also noticing left lower abdominal and groin pain, for the past 2 months she had a bulge that was reducible, not causing her any pain, no constipation or diarrhea.    Over the same 2 months.  Patient also having left superior shoulder pain, she felt this when she was bending over helping her great granddaughter tie her shoes.  Patient notes no pain with daily activities, only noticing pain when she reaches posteriorly, sharp shooting pain at that time.  No relief methods used to date              Review of Systems         Objective    BP (!) 185/70 (BP Location: Right arm, Patient Position: Sitting, Cuff Size: Adult Regular)   Pulse 82   Temp 97.3  F (36.3  C) (Temporal)   Resp 18   Ht 1.633 m (5' 4.3\")   Wt 61.7 kg (136 lb)   SpO2 97%   BMI 23.13 kg/m    Body mass index is 23.13 kg/m .  Physical Exam   GENERAL: healthy, alert and no distress  NECK: no adenopathy, no asymmetry, masses, or scars and thyroid normal to palpation  RESP: lungs clear to auscultation - no rales, rhonchi or wheezes  CV: regular rate and rhythm, normal S1 S2, no S3 or S4, no murmur, click or rub, no peripheral edema and peripheral pulses strong  ABDOMEN: soft, nontender, no hepatosplenomegaly, no masses and bowel sounds normal; reducible left inguinal hernia, no skin changes, nontender  MS: no gross musculoskeletal defects noted, no edema      Left Shoulder Exam     Tenderness   The patient is experiencing no tenderness.     Range of Motion   Active abduction:  normal   Passive abduction:  normal   Extension:  normal   External " rotation:  normal   Forward flexion:  normal   Internal rotation 0 degrees:  normal   Internal rotation 90 degrees:  normal     Tests   Apprehension: negative  Waters test: negative  Cross arm: negative  Impingement: negative     Comments:  Superior L trap with ropy lesion, TTP, no skin changes

## 2023-10-09 PROCEDURE — 82274 ASSAY TEST FOR BLOOD FECAL: CPT | Performed by: PHYSICIAN ASSISTANT

## 2023-10-11 LAB — HEMOCCULT STL QL IA: NEGATIVE

## 2023-11-05 ENCOUNTER — HEALTH MAINTENANCE LETTER (OUTPATIENT)
Age: 83
End: 2023-11-05

## 2024-01-15 ASSESSMENT — ENCOUNTER SYMPTOMS
EYE PAIN: 0
SORE THROAT: 0
DIARRHEA: 0
SHORTNESS OF BREATH: 0
ABDOMINAL PAIN: 0
FREQUENCY: 0
NERVOUS/ANXIOUS: 0
HEADACHES: 0
CONSTIPATION: 0
CHILLS: 0
MYALGIAS: 1
WEAKNESS: 1
COUGH: 0
DYSURIA: 0
JOINT SWELLING: 0
HEARTBURN: 0
ARTHRALGIAS: 1
PARESTHESIAS: 0
PALPITATIONS: 0
HEMATURIA: 0
BREAST MASS: 0
HEMATOCHEZIA: 0
NAUSEA: 0
DIZZINESS: 0
FEVER: 0

## 2024-01-15 ASSESSMENT — ACTIVITIES OF DAILY LIVING (ADL): CURRENT_FUNCTION: NO ASSISTANCE NEEDED

## 2024-01-19 ENCOUNTER — OFFICE VISIT (OUTPATIENT)
Dept: FAMILY MEDICINE | Facility: CLINIC | Age: 84
End: 2024-01-19
Payer: COMMERCIAL

## 2024-01-19 VITALS
RESPIRATION RATE: 14 BRPM | HEIGHT: 65 IN | TEMPERATURE: 98.1 F | HEART RATE: 62 BPM | WEIGHT: 143.6 LBS | OXYGEN SATURATION: 97 % | SYSTOLIC BLOOD PRESSURE: 136 MMHG | BODY MASS INDEX: 23.93 KG/M2 | DIASTOLIC BLOOD PRESSURE: 74 MMHG

## 2024-01-19 DIAGNOSIS — H61.21 IMPACTED CERUMEN OF RIGHT EAR: ICD-10-CM

## 2024-01-19 DIAGNOSIS — Z00.00 ENCOUNTER FOR MEDICARE ANNUAL WELLNESS EXAM: Primary | ICD-10-CM

## 2024-01-19 DIAGNOSIS — N95.8 OTHER SPECIFIED MENOPAUSAL AND PERIMENOPAUSAL DISORDERS: ICD-10-CM

## 2024-01-19 DIAGNOSIS — M81.6 LOCALIZED OSTEOPOROSIS WITHOUT CURRENT PATHOLOGICAL FRACTURE: ICD-10-CM

## 2024-01-19 PROCEDURE — 69209 REMOVE IMPACTED EAR WAX UNI: CPT | Mod: RT | Performed by: FAMILY MEDICINE

## 2024-01-19 PROCEDURE — G0439 PPPS, SUBSEQ VISIT: HCPCS | Performed by: FAMILY MEDICINE

## 2024-01-19 RX ORDER — RESPIRATORY SYNCYTIAL VIRUS VACCINE 120MCG/0.5
0.5 KIT INTRAMUSCULAR ONCE
Qty: 1 EACH | Refills: 0 | Status: CANCELLED | OUTPATIENT
Start: 2024-01-19 | End: 2024-01-19

## 2024-01-19 ASSESSMENT — ENCOUNTER SYMPTOMS
DIZZINESS: 0
CHILLS: 0
SHORTNESS OF BREATH: 0
PALPITATIONS: 0
DYSURIA: 0
EYE PAIN: 0
JOINT SWELLING: 0
MYALGIAS: 1
NAUSEA: 0
CONSTIPATION: 0
ABDOMINAL PAIN: 0
DIARRHEA: 0
WEAKNESS: 1
FREQUENCY: 0
SORE THROAT: 0
HEADACHES: 0
NERVOUS/ANXIOUS: 0
ARTHRALGIAS: 1
COUGH: 0
HEMATURIA: 0
FEVER: 0

## 2024-01-19 ASSESSMENT — ACTIVITIES OF DAILY LIVING (ADL): CURRENT_FUNCTION: NO ASSISTANCE NEEDED

## 2024-01-19 ASSESSMENT — PAIN SCALES - GENERAL: PAINLEVEL: NO PAIN (0)

## 2024-01-19 NOTE — PROGRESS NOTES
"Preventive Care Visit  [facility]  Carmen Laura Beckett MD, Family Medicine  Jan 19, 2024      SUBJECTIVE:   Nadiya is a 83 year old, presenting for the following:  Medicare Visit        1/19/2024     7:22 AM   Additional Questions   Roomed by Viviana LOO     Are you in the first 12 months of your Medicare coverage?  No    Healthy Habits:     In general, how would you rate your overall health?  Fair    Frequency of exercise:  2-3 days/week    Duration of exercise:  15-30 minutes    Do you usually eat at least 4 servings of fruit and vegetables a day, include whole grains    & fiber and avoid regularly eating high fat or \"junk\" foods?  No    Taking medications regularly:  Not Applicable    Medication side effects:  Not applicable    Ability to successfully perform activities of daily living:  No assistance needed    Home Safety:  No safety concerns identified    Hearing Impairment:  No hearing concerns    In the past 6 months, have you been bothered by leaking of urine? Yes    In general, how would you rate your overall mental or emotional health?  Good    Additional concerns today:  No      Today's PHQ-2 Score:       1/18/2024     5:19 PM   PHQ-2 ( 1999 [employer])   Q1: Little interest or pleasure in doing things 1   Q2: Feeling down, depressed or hopeless 1   PHQ-2 Score 2   Q1: Little interest or pleasure in doing things Several days   Q2: Feeling down, depressed or hopeless Several days   PHQ-2 Score 2           Have you ever done Advance Care Planning? (For example, a Health Directive, POLST, or a discussion with a medical provider or your loved ones about your wishes): No, advance care planning information given to patient to review.  Patient plans to discuss their wishes with loved ones or provider.         Fall risk  Fallen 2 or more times in the past year?: No  Any fall with injury in the past year?: No    Cognitive Screening   1) Repeat 3 items (Leader, Season, Table)    2) Clock " draw: NORMAL  3) 3 item recall: Recalls 2 objects   Results: NORMAL clock, 1-2 items recalled: COGNITIVE IMPAIRMENT LESS LIKELY    Mini-CogTM Copyright PARVIZ Rodriguez. Licensed by the author for use in [facility]; reprinted with permission (faby@[facility]). All rights reserved.      Do you have sleep apnea, excessive snoring or daytime drowsiness? : no    Reviewed and updated as needed this visit by clinical staff   Tobacco  Allergies  Meds              Reviewed and updated as needed this visit by Provider                  Social History     Tobacco Use    Smoking status: Former     Packs/day: 0.25     Years: 40.00     Additional pack years: 0.00     Total pack years: 10.00     Types: Cigarettes     Start date: 1965     Quit date: 2023     Years since quittin.9     Passive exposure: Past    Smokeless tobacco: Never    Tobacco comments:     2 pks. weekly   Substance Use Topics    Alcohol use: No     Alcohol/week: 0.0 standard drinks of alcohol             1/15/2024     7:24 PM   Alcohol Use   Prescreen: >3 drinks/day or >7 drinks/week? Not Applicable          No data to display              Do you have a current opioid prescription? No  Do you use any other controlled substances or medications that are not prescribed by a provider? None    Current providers sharing in care for this patient include:   Patient Care Team:  Carmen Beckett MD as PCP - General (Family Medicine)  Carmen Beckett MD as Assigned PCP    The following health maintenance items are reviewed in Epic and correct as of today:  Health Maintenance   Topic Date Due    IPV IMMUNIZATION (2 of 3 - Adult catch-up series) 2007    DEXA  10/29/2022    LIPID  2024    MEDICARE ANNUAL WELLNESS VISIT  2025    ANNUAL REVIEW OF HM ORDERS  2025    FALL RISK ASSESSMENT  2025    DTAP/TDAP/TD IMMUNIZATION (3 - Td or Tdap) 2026    ADVANCE CARE PLANNING  2029    PHQ-2 (once per calendar year)   Completed    INFLUENZA VACCINE  Completed    Pneumococcal Vaccine: 65+ Years  Completed    ZOSTER IMMUNIZATION  Completed    RSV VACCINE (Pregnancy & 60+)  Completed    COVID-19 Vaccine  Completed    HPV IMMUNIZATION  Aged Out    MENINGITIS IMMUNIZATION  Aged Out    RSV MONOCLONAL ANTIBODY  Aged Out    MAMMO SCREENING  Discontinued     Patient Active Problem List   Diagnosis    CARDIOVASCULAR SCREENING; LDL GOAL LESS THAN 130    Health Care Home    Onychomycosis    Hammer toe    Lipoma of skin and subcutaneous tissue    Advanced care planning/counseling discussion    Osteoporosis    Chronic maxillary sinusitis    Cataract    Full dentures    Ganglion cyst of both feet    Osteopenia of multiple sites     Past Surgical History:   Procedure Laterality Date    BLEPHAROPLASTY Bilateral 2002    BUNIONECTOMY Left 2007    CATARACT IOL, RT/LT Bilateral 2007       Social History     Tobacco Use    Smoking status: Former     Packs/day: 0.25     Years: 40.00     Additional pack years: 0.00     Total pack years: 10.00     Types: Cigarettes     Start date: 1965     Quit date: 2023     Years since quittin.9     Passive exposure: Past    Smokeless tobacco: Never    Tobacco comments:     2 pks. weekly   Substance Use Topics    Alcohol use: No     Alcohol/week: 0.0 standard drinks of alcohol     Family History   Problem Relation Age of Onset    Heart Disease Mother     Heart Disease Father     LUNG DISEASE Sister         unclear, asthma like    Colon Cancer Sister 81    Diabetes Type 2  Sister         resolved after colon cancer surgery    Alcohol/Drug Brother         lived in [place]    Diabetes Type 1 Daughter     Allergies Daughter     Diabetes Type 1 Grandchild          Current Outpatient Medications   Medication Sig Dispense Refill    Multiple Vitamins-Minerals (OCUVITE PO) Eye vitamins daily       Allergies   Allergen Reactions    Alendronate Muscle Pain (Myalgia)     Mammogram Screening - Patient  "over age 75, has elected to discontinue screenings.  Pertinent mammograms are reviewed under the imaging tab. Last mammogram 2013, normal.    Review of Systems   Constitutional:  Negative for chills and fever.   HENT:  Negative for congestion, ear pain, hearing loss and sore throat.    Eyes:  Negative for pain and visual disturbance.   Respiratory:  Negative for cough and shortness of breath.    Cardiovascular:  Negative for chest pain and palpitations.   Gastrointestinal:  Negative for abdominal pain, constipation, diarrhea and nausea.   Genitourinary:  Negative for dysuria, frequency, genital sores, hematuria, pelvic pain, urgency, vaginal bleeding and vaginal discharge.   Musculoskeletal:  Positive for arthralgias and myalgias. Negative for joint swelling.   Skin:  Negative for rash.   Neurological:  Positive for weakness. Negative for dizziness and headaches.   Psychiatric/Behavioral:  The patient is not nervous/anxious.         OBJECTIVE:   /74 (BP Location: Right arm, Patient Position: Sitting, Cuff Size: Adult Regular)   Pulse 62   Temp 98.1  F (36.7  C) (Temporal)   Resp 14   Ht 1.645 m (5' 4.75\")   Wt 65.1 kg (143 lb 9.6 oz)   SpO2 97%   BMI 24.08 kg/m     Estimated body mass index is 24.08 kg/m  as calculated from the following:    Height as of this encounter: 1.645 m (5' 4.75\").    Weight as of this encounter: 65.1 kg (143 lb 9.6 oz).  Physical Exam  GENERAL: alert and no distress  EYES: Eyes grossly normal to inspection, PERRL and conjunctivae and sclerae normal  HENT: ear canals and TM's normal, nose and mouth without ulcers or lesions; left ear canal clear with normal TM, right ear has impacted cerumen obscuring view of TM. Ear wash performed today right side.  NECK: no adenopathy, no asymmetry, masses, or scars  RESP: lungs clear to auscultation - no rales, rhonchi or wheezes  CV: regular rate and rhythm, normal S1 S2, no S3 or S4, no murmur, click or rub, no peripheral edema  ABDOMEN: " soft, nontender, no hepatosplenomegaly, no masses and bowel sounds normal  MS: no gross musculoskeletal defects noted, no edema  SKIN: no suspicious lesions or rashes  NEURO: Normal strength and tone, mentation intact and speech normal  PSYCH: mentation appears normal, affect normal/bright  LYMPH: no cervical, supraclavicular, axillary, or inguinal adenopathy      ASSESSMENT / PLAN:   (Z00.00) Encounter for Medicare annual wellness exam  (primary encounter diagnosis)  Plan: DEXA HIP/PELVIS/SPINE - Future    (M81.0) Osteoporosis  Comment: stable, last dexa 2019, will recheck dexa, see order  Plan: Will fu as indicated.   (N95.8) Other specified menopausal and perimenopausal disorders  Plan: DEXA HIP/PELVIS/SPINE - Future    (H61.21) Impacted cerumen of right ear  Comment: noted on exam today, with patient reporting some tinnitis  Plan: REMOVE IMPACTED CERUMEN        Procedure to remove ear wax performed          Counseling  Reviewed preventive health counseling, as reflected in patient instructions        She reports that she quit smoking about a year ago. Her smoking use included cigarettes. She started smoking about 59 years ago. She has a 10 pack-year smoking history. She has been exposed to tobacco smoke. She has never used smokeless tobacco.      Appropriate preventive services were discussed with this patient, including applicable screening as appropriate for fall prevention, nutrition, physical activity, Tobacco-use cessation, weight loss and cognition.  Checklist reviewing preventive services available has been given to the patient.    Reviewed patients plan of care and provided an AVS. The Basic Care Plan (routine screening as documented in Health Maintenance) for Nadiya meets the Care Plan requirement. This Care Plan has been established and reviewed with the Patient.          Signed Electronically by: Carmen Beckett MD    Identified Health Risks  I have reviewed Opioid Use Disorder and Substance Use  Disorder risk factors and made any needed referrals.   Answers submitted by the patient for this visit:  Annual Preventive Visit (Submitted on 1/15/2024)  Chief Complaint: Annual Exam:  Blood in stool: No  heartburn: No  peripheral edema: No  mood changes: No  Skin sensation changes: No  tenderness: No  breast mass: No

## 2024-01-19 NOTE — PATIENT INSTRUCTIONS
Please call [facility] Radiology Department to schedule an outpatient appointment at [phone].       DEXA SCAN  Please call [facility] Radiology at [phone] to schedule your dexa scan.    Locations: [facility], [facility] [id number] [place] [id number]       Preventive Health Recommendations    See your health care provider every year to  Review health changes.   Discuss preventive care.    Review your medicines if your doctor has prescribed any.  You no longer need a yearly Pap test unless you've had an abnormal Pap test in the past 10 years. If you have vaginal symptoms, such as bleeding or discharge, be sure to talk with your provider about a Pap test.  Every 1 to 2 years, have a mammogram.  If you are over 69, talk with your health care provider about whether or not you want to continue having screening mammograms.  Every 10 years, have a colonoscopy. Or, have a yearly FIT test (stool test). These exams will check for colon cancer.   Have a cholesterol test every 5 years, or more often if your doctor advises it.   Have a diabetes test (fasting glucose) every three years. If you are at risk for diabetes, you should have this test more often.   At age 65, have a bone density scan (DEXA) to check for osteoporosis (brittle bone disease).    Shots:  Get a flu shot each year.  Get a tetanus shot every 10 years.  Talk to your doctor about your pneumonia vaccines. There are now two you should receive - Pneumovax (PPSV 23) and Prevnar (PCV 13).  Talk to your pharmacist about the shingles vaccine.  Talk to your doctor about the hepatitis B vaccine.    Nutrition:   Eat at least 5 servings of fruits and vegetables each day.  Eat whole-grain bread, whole-wheat pasta and brown rice instead of white grains and rice.  Get adequate Calcium and Vitamin D.     Lifestyle  Exercise at least 150 minutes a week (30 minutes a day, 5 days a week). This will  help you control your weight and prevent disease.  Limit alcohol to one drink per day.  No smoking.   Wear sunscreen to prevent skin cancer.   See your dentist twice a year for an exam and cleaning.  See your eye doctor every 1 to 2 years to screen for conditions such as glaucoma, macular degeneration and cataracts.    Personalized Prevention Plan  You are due for the preventive services outlined below.  Your care team is available to assist you in scheduling these services.  If you have already completed any of these items, please share that information with your care team to update in your medical record.  Health Maintenance   Topic Date Due    RSV VACCINE (Pregnancy & 60+) (1 - 1-dose 60+ series) Never done    IPV IMMUNIZATION (2 of 3 - Adult catch-up series) 06/08/2007    DEXA  10/29/2022    MEDICARE ANNUAL WELLNESS VISIT  09/15/2023    LIPID  04/13/2024    ANNUAL REVIEW OF HM ORDERS  10/06/2024    FALL RISK ASSESSMENT  01/19/2025    DTAP/TDAP/TD IMMUNIZATION (3 - Td or Tdap) 09/29/2026    ADVANCE CARE PLANNING  01/19/2029    PHQ-2 (once per calendar year)  Completed    INFLUENZA VACCINE  Completed    Pneumococcal Vaccine: 65+ Years  Completed    ZOSTER IMMUNIZATION  Completed    COVID-19 Vaccine  Completed    HPV IMMUNIZATION  Aged Out    MENINGITIS IMMUNIZATION  Aged Out    RSV MONOCLONAL ANTIBODY  Aged Out    MAMMO SCREENING  Discontinued     Your Health Risk Assessment indicates you feel you are not in good health    A healthy lifestyle helps keep the body fit and the mind alert. It helps protect you from disease, helps you fight disease, and helps prevent chronic disease (disease that doesn't go away) from getting worse. This is important as you get older and begin to notice twinges in muscles and joints and a decline in the strength and stamina you once took for granted. A healthy lifestyle includes good healthcare, good nutrition, weight control, recreation, and regular exercise. Avoid harmful substances  and do what you can to keep safe. Another part of a healthy lifestyle is stay mentally active and socially involved.    Good healthcare   Have a wellness visit every year.   If you have new symptoms, let us know right away. Don't wait until the next checkup.   Take medicines exactly as prescribed and keep your medicines in a safe place. Tell us if your medicine causes problems.   Healthy diet and weight control   Eat 3 or 4 small, nutritious, low-fat, high-fiber meals a day. Include a variety of fruits, vegetables, and whole-grain foods.   Make sure you get enough calcium in your diet. Calcium, vitamin D, and exercise help prevent osteoporosis (bone thinning).   If you live alone, try eating with others when you can. That way you get a good meal and have company while you eat it.   Try to keep a healthy weight. If you eat more calories than your body uses for energy, it will be stored as fat and you will gain weight.     Recreation   Recreation is not limited to sports and team events. It includes any activity that provides relaxation, interest, enjoyment, and exercise. Recreation provides an outlet for physical, mental, and social energy. It can give a sense of worth and achievement. It can help you stay healthy.    Mental Exercise and Social Involvement  Mental and emotional health is as important as physical health. Keep in touch with friends and family. Stay as active as possible. Continue to learn and challenge yourself.   Things you can do to stay mentally active are:  Learn something new, like a foreign language or musical instrument.   Play SCRABBLE or do crossword puzzles. If you cannot find people to play these games with you at home, you can play them with others on your computer through the Internet.   Join a games club--anything from card games to chess or checkers or lawn bowling.   Start a new hobby.   Go back to school.   Volunteer.   Read.   Keep up with world events.  Learning About Dietary  Guidelines  What are the Dietary Guidelines for Americans?     Dietary Guidelines for Americans provide tips for eating well and staying healthy. This helps reduce the risk for long-term (chronic) diseases.  These guidelines recommend that you:  Eat and drink the right amount for you. The U.S. government's food guide is called MyPlate. It can help you make your own well-balanced eating plan.  Try to balance your eating with your activity. This helps you stay at a healthy weight.  Drink alcohol in moderation, if at all.  Limit foods high in salt, saturated fat, trans fat, and added sugar.  These guidelines are from the U.S. Department of Agriculture and the U.S. Department of Health and Human Services. They are updated every 5 years.  What is MyPlate?  MyPlate is the U.S. government's food guide. It can help you make your own well-balanced eating plan. A balanced eating plan means that you eat enough, but not too much, and that your food gives you the nutrients you need to stay healthy.  MyPlate focuses on eating plenty of whole grains, fruits, and vegetables, and on limiting fat and sugar. It is available online at www.ChooseMyPlate.gov.  How can you get started?  If you're trying to eat healthier, you can slowly change your eating habits over time. You don't have to make big changes all at once. Start by adding one or two healthy foods to your meals each day.  Grains  Choose whole-grain breads and cereals and whole-wheat pasta and whole-grain crackers.  Vegetables  Eat a variety of vegetables every day. They have lots of nutrients and are part of a heart-healthy diet.  Fruits  Eat a variety of fruits every day. Fruits contain lots of nutrients. Choose fresh fruit instead of fruit juice.  Protein foods  Choose fish and lean poultry more often. Eat red meat and fried meats less often. Dried beans, tofu, and nuts are also good sources of protein.  Dairy  Choose low-fat or fat-free products from this food group. If you  "have problems digesting milk, try eating cheese or yogurt instead.  Fats and oils  Limit fats and oils if you're trying to cut calories. Choose healthy fats when you cook. These include canola oil and olive oil.  Where can you learn more?  Go to https://www.[employer].net/patiented  Enter D676 in the search box to learn more about \"Learning About Dietary Guidelines.\"  Current as of: February 28, 2023               Content Version: 13.8    9767-3213 [employer].   Care instructions adapted under license by your healthcare professional. If you have questions about a medical condition or this instruction, always ask your healthcare professional. [employer] disclaims any warranty or liability for your use of this information.      Bladder Training: Care Instructions  Your Care Instructions     Bladder training is used to treat urge incontinence and stress incontinence. Urge incontinence means that the need to urinate comes on so fast that you can't get to a toilet in time. Stress incontinence means that you leak urine because of pressure on your bladder. For example, it may happen when you laugh, cough, or lift something heavy.  Bladder training can increase how long you can wait before you have to urinate. It can also help your bladder hold more urine. And it can give you better control over the urge to urinate.  It is important to remember that bladder training takes a few weeks to a few months to make a difference. You may not see results right away, but don't give up.  Follow-up care is a key part of your treatment and safety. Be sure to make and go to all appointments, and call your doctor if you are having problems. It's also a good idea to know your test results and keep a list of the medicines you take.  How can you care for yourself at home?  Work with your doctor to come up with a bladder training program that is right for you. You may use one or more of the following " "methods.  Delayed urination  In the beginning, try to keep from urinating for 5 minutes after you first feel the need to go.  While you wait, take deep, slow breaths to relax. Kegel exercises can also help you delay the need to go to the bathroom.  After some practice, when you can easily wait 5 minutes to urinate, try to wait 10 minutes before you urinate.  Slowly increase the waiting period until you are able to control when you have to urinate.  Scheduled urination  Empty your bladder when you first wake up in the morning.  Schedule times throughout the day when you will urinate.  Start by going to the bathroom every hour, even if you don't need to go.  Slowly increase the time between trips to the bathroom.  When you have found a schedule that works well for you, keep doing it.  If you wake up during the night and have to urinate, do it. Apply your schedule to waking hours only.  Kegel exercises  These tighten and strengthen pelvic muscles, which can help you control the flow of urine. (If doing these exercises causes pain, stop doing them and talk with your doctor.) To do Kegel exercises:  Squeeze your muscles as if you were trying not to pass gas. Or squeeze your muscles as if you were stopping the flow of urine. Your belly, legs, and buttocks shouldn't move.  Hold the squeeze for 3 seconds, then relax for 5 to 10 seconds.  Start with 3 seconds, then add 1 second each week until you are able to squeeze for 10 seconds.  Repeat the exercise 10 times a session. Do 3 to 8 sessions a day.  When should you call for help?  Watch closely for changes in your health, and be sure to contact your doctor if:    Your incontinence is getting worse.     You do not get better as expected.   Where can you learn more?  Go to https://www.healthwise.net/patiented  Enter V684 in the search box to learn more about \"Bladder Training: Care Instructions.\"  Current as of: February 28, 2023               Content Version: 13.8    " 7158-6964 [employer].   Care instructions adapted under license by your healthcare professional. If you have questions about a medical condition or this instruction, always ask your healthcare professional. Healthwise, [employer] disclaims any warranty or liability for your use of this information.

## 2024-01-19 NOTE — PROGRESS NOTES
The patient was provided with suggestions to help her develop a healthy physical lifestyle.  The patient was counseled and encouraged to consider modifying their diet and eating habits. She was provided with information on recommended healthy diet options.  Information on urinary incontinence and treatment options given to patient.

## 2024-01-19 NOTE — NURSING NOTE
Patient identified using two patient identifiers.  Ear exam showing wax occlusion completed by RN.  H202/H20 was placed in the right ear(s) via irrigation tool: elephant ear.

## 2024-03-07 ENCOUNTER — OFFICE VISIT (OUTPATIENT)
Dept: PODIATRY | Facility: CLINIC | Age: 84
End: 2024-03-07
Payer: COMMERCIAL

## 2024-03-07 VITALS — WEIGHT: 140 LBS | SYSTOLIC BLOOD PRESSURE: 159 MMHG | BODY MASS INDEX: 23.48 KG/M2 | DIASTOLIC BLOOD PRESSURE: 58 MMHG

## 2024-03-07 DIAGNOSIS — M20.41 HAMMERTOE OF RIGHT FOOT: Primary | ICD-10-CM

## 2024-03-07 DIAGNOSIS — M89.8X7 EXOSTOSIS OF BOTH FEET: ICD-10-CM

## 2024-03-07 PROCEDURE — 99204 OFFICE O/P NEW MOD 45 MIN: CPT | Performed by: PODIATRIST

## 2024-03-07 NOTE — PROGRESS NOTES
"Foot & Ankle Surgery  March 7, 2024    CC: \"Have a hammertoe + 2 bunions\"    I was asked to see Nadiya Drummond regarding the chief complaint by: Self    HPI:  Pt is a 83 year old female who presents with above complaint.  Multiple year history of above issues.  She points to the right second hammertoe and bone spurs at the dorsal midfoot bilateral (these are the bunion she refers to).  Describes burning and aching pain, 8 out of 10, worse with weightbearing.  She had right tailor's bunion surgery and left second hammertoe surgery \"12 to 15 years ago\".  Now the right second toe is painful and the dorsal midfoot spurs are bothersome.  No treatment thus far    ROS:   Pos for CC.  The patient denies current nausea, vomiting, chills, fevers, belly pain, calf pain, chest pain or SOB.  Complete remainder of ROS is otherwise neg.    VITALS:    Vitals:    03/07/24 0857   BP: (!) 159/58   Weight: 63.5 kg (140 lb)       PMH:    Past Medical History:   Diagnosis Date    Lactose intolerance     TMJ (temporomandibular joint disorder)     Tobacco abuse        SXHX:    Past Surgical History:   Procedure Laterality Date    BLEPHAROPLASTY Bilateral 01/01/2002    BUNIONECTOMY Left 01/01/2007    CATARACT IOL, RT/LT Bilateral 01/01/2007        MEDS:    Current Outpatient Medications   Medication    Multiple Vitamins-Minerals (OCUVITE PO)     No current facility-administered medications for this visit.       ALL:     Allergies   Allergen Reactions    Alendronate Muscle Pain (Myalgia)       FMH:    Family History   Problem Relation Age of Onset    Heart Disease Mother     Heart Disease Father     LUNG DISEASE Sister         unclear, asthma like    Colon Cancer Sister 81    Diabetes Type 2  Sister         resolved after colon cancer surgery    Alcohol/Drug Brother         lived in [facility]    Diabetes Type 1 Daughter     Allergies Daughter     Diabetes Type 1 Grandchild        SocHx:    Social History     Socioeconomic History    Marital " status: Single     Spouse name: Not on file    Number of children: 3    Years of education: Not on file    Highest education level: Not on file   Occupational History    Not on file   Tobacco Use    Smoking status: Former     Packs/day: 0.25     Years: 40.00     Additional pack years: 0.00     Total pack years: 10.00     Types: Cigarettes     Start date: 1965     Quit date: 2023     Years since quittin.0     Passive exposure: Past    Smokeless tobacco: Never    Tobacco comments:     2 pks. weekly   Vaping Use    Vaping Use: Never used   Substance and Sexual Activity    Alcohol use: No     Alcohol/week: 0.0 standard drinks of alcohol    Drug use: No    Sexual activity: Yes     Partners: Male   Other Topics Concern     Service No    Blood Transfusions No    Caffeine Concern Not Asked    Occupational Exposure Not Asked    Hobby Hazards Not Asked    Sleep Concern Not Asked    Stress Concern Not Asked    Weight Concern Not Asked    Special Diet Not Asked    Back Care Not Asked    Exercise No    Bike Helmet Not Asked    Seat Belt Yes    Self-Exams No    Parent/sibling w/ CABG, MI or angioplasty before 65F 55M? Not Asked   Social History Narrative    07-    Balanced Diet - Yes    Osteoporosis Prevention Measures - Dairy servings per day: 1 and Medication/Supplements (See current meds)    Regular Exercise -  No Describe     Dental Exam - has false teeth,got them  about 9 months ago    Eye Exam -     Self Breast Exam - No    Abuse: Current or Past (Physical, Sexual or Emotional)- No    Do you feel safe in your environment - Yes    Guns stored in the home - Yes    Sunscreen used - No    Seatbelts used - Yes    Lipids -      Glucose -      Colon Cancer Screening - Flexible Sigmoidoscopy about 5 years ago, at the [facility](date completed)    Hemoccults - NO    Pap Test -  About 3 years ago    Do you have any concerns about STD's -  No    Mammography - about 3 years ago    DEXA -  about 3 years ago    Immunizations reviewed and up to date - pt. Thinks she had a TD, shot a year ago             Social Determinants of Health     Financial Resource Strain: Low Risk  (1/15/2024)    Financial Resource Strain     Within the past 12 months, have you or your family members you live with been unable to get utilities (heat, electricity) when it was really needed?: No   Food Insecurity: Low Risk  (1/15/2024)    Food Insecurity     Within the past 12 months, did you worry that your food would run out before you got money to buy more?: No     Within the past 12 months, did the food you bought just not last and you didn t have money to get more?: No   Transportation Needs: Low Risk  (1/15/2024)    Transportation Needs     Within the past 12 months, has lack of transportation kept you from medical appointments, getting your medicines, non-medical meetings or appointments, work, or from getting things that you need?: No   Physical Activity: Not on file   Stress: Not on file   Social Connections: Not on file   Interpersonal Safety: Low Risk  (1/19/2024)    Interpersonal Safety     Do you feel physically and emotionally safe where you currently live?: Yes     Within the past 12 months, have you been hit, slapped, kicked or otherwise physically hurt by someone?: No     Within the past 12 months, have you been humiliated or emotionally abused in other ways by your partner or ex-partner?: No   Housing Stability: Low Risk  (1/15/2024)    Housing Stability     Do you have housing? : Yes     Are you worried about losing your housing?: No           EXAMINATION:  Gen:   No apparent distress  Neuro:   A&Ox3, no deficits  Psych:    Answering questions appropriately for age and situation with normal affect  Head:    NCAT  Eye:    Visual scanning without deficit  Ear:    Response to auditory stimuli wnl  Lung:    Non-labored breathing on RA noted  Abd:    NTND per patient report  Lymph:    Neg for pitting/non-pitting edema  "BLE  Vasc:    Pulses palpable, CFT minimally delayed  Neuro:    Light touch sensation intact to all sensory nerve distributions without paresthesias  Derm:    Neg for nodules, lesions or ulcerations  MSK:   Lesser digital hammertoes are seen.  Specifically, the right second hammertoe presents with a fixed PIPJ flexion.  Prominent dorsal exostoses near the tarsometatarsal joints bilateral  Calf:    Neg for redness, swelling or tenderness    Assessment:  83 year old female with painful right second hammertoe; prominent dorsal exostosis bilaterally of the first tarsometatarsal joint      Medical Decision Making/Plan:  Discussed etiologies, anatomy and options  1.  Painful right second hammertoe  -I personally reviewed and interpreted the patient's lower extremity history pertinent to today's visit, including imaging/labs, in preparation for initiating a treatment program.  -Our hammertoe handout was dispensed  -Conservatively, we discussed accommodative shoe gear and padding/spacer/splinting options if this fails to control symptoms, we discussed surgical  -Intervention in the form of PIPJ arthrodesis.  We reviewed the procedure and a generic postop recovery for patient information and planning  -Our surgery scheduling handout was dispensed    2.  Prominent painful dorsal midfoot exostoses bilateral  -Again, these are the \"bunions\" that she is referring to  -Conservatively, we discussed accommodative shoes, padding and RICE/NSAID options  -Surgically, we discussed exostectomies as well as a generic postop recovery for patient information and plan  -I think it would be reasonable to address both of these at the same time as the hammertoe to minimize downtime and recovery time    Job duties; time off work - retired;   Smoking history - neg  Vit D - n/a  Diabetic/A1c - neg  Hemoglobin - draw prior  Clot history - neg/neg  Allergies to surgical implants/suture - neg  Allergies/issues with narcotics - neg    Procedure(s):  " 1.  Right 2nd hammertoe correction; 2.  Bilateral bone spur resection  Consent: above  Diagnosis:  hammertoe  Equipment/Vendor: arthrex hammertoe    Pain Med Plan:  norco, atarax, ibuprofen  DVT prophylaxis:  Mechanical,   WB Status Post-op:  heel WB with crutches  WB Device:  shoe, crutches  Vit D Supplementation:  2000U        Follow up:  prn or sooner with acute issues      Patient's medical history was reviewed today      Justin Sanchez DPM FACFAS FACFAOM  Podiatric Foot & Ankle Surgeon  [facility]  [phone]    Disclaimer: This note consists of symbols derived from keyboarding, dictation and/or voice recognition software. As a result, there may be errors in the script that have gone undetected. Please consider this when interpreting information found in this chart.

## 2024-03-07 NOTE — PATIENT INSTRUCTIONS
Thank you for choosing [facility] Podiatry / Foot & Ankle Surgery!    DR. WHIPPLE'S CLINIC LOCATIONS:     [facility] (Friday) TRIAGE LINE: [phone] [address] [facility]  APPOINTMENTS: [phone]   HELENA Szymanski [address] RADIOLOGY: [phone]    PHYSICAL THERAPY: [phone]    SET UP SURGERY: [phone]   [place] (Mon-Tues AM-Thurs) BILLING QUESTIONS: [phone] [address] FAX: [phone]   HELENA Quevedo [address] [place] [facility]: [phone]       You were seen today for painful right second hammertoe and for painful bone spurs at the top of both feet.    Conservative management: Comfortable accommodative shoes and padding can help to minimize pressure on the toe and the bone spurs in the top of both feet.  Resting/icing/anti-inflammatories or Tylenol can be helpful as well.    Surgical management: Surgery can be done to the right second toe to remove the stiff joint and to straighten the toe.  We can also remove the bone spurs in the top of both feet.  The recovery for all 3 procedures is the same so this will hopefully help to get you back on your feet in a more timely fashion.  The incisions take approximate 2 weeks to heal so for those 2 weeks, you are icing elevating and resting at home.  He would be heel weightbearing in surgical shoes bilateral with crutches.  Once incisions have healed, you would be cleared to slowly increase activities and return to a shoe on the left foot.  You would continue with heel weightbearing in the surgical shoe for the right foot.  If x-rays at your 6-week postop appointment show that the right second hammertoe is healing well, you will be cleared to start increasing weight on the foot.  If x-rays at your 10-week postop appointment show adequate healing, you will be cleared to transition back to a regular shoe.    2.  Painful thickened hallux nails bilateral.  See below for routine footcare options for her nails trimmed and filed.  " If this fails to provide adequate relief, we can schedule a 30-minute appointment where we can remove both toenails.      Here is a list of routine foot care resources, which includes toenail trimming and callus/corn management.     This is not a referral. It is your responsibility to contact the organization and your insurance to confirm cost and coverage.      ROUTINE FOOT CARE (NAIL TRIMMING / CALLUSES)      [facility]  [phone]   [facility]  [phone]  Multiple locations   [facility]  [phone] Dr. Dior and Dr. Ching  [address] [facility] [address]  [place] [address]  [phone]   [facility]  [phone]  [email or web address]                  Surgical planning.  If you have decided to have surgery, follow these few steps to get the procedure scheduled and to have the proper paperwork filled out.  If you are unsure about surgery, or would like to sit down and further discuss your issue and treatment options, please make a clinic appointment with Dr Sanchez.    1.  Pick the date that you would like to have surgery.  Keep in mind that you will likely need at least 2 weeks off after the procedure for proper rest and healing.    2.  Call the surgery scheduling line at [phone] to get the procedure scheduled.  My  will assist you in getting surgery set up.      3.  Make an appointment to see Dr Sanchez within 1-2 weeks of the date of surgery for your pre-operative consult.  When making the appointment, say \"I need to make a 30 minute surgical consult with Dr Sanchez\".  All the paperwork will be ready for you during the visit.  It is recommended that you bring a spouse, family member or friend with you.  There will be lots of information presented.  It can be overwhelming, and it is better to have someone there to help sort out the details.    4.  Make an appointment to see your Primary Care Physician within 4 weeks of the date of surgery for your \"Pre-operative " "History and Physical\".  This is done to make sure you are medically healthy to undergo surgery.        If you have any post-operative questions regarding your procedure, call our triage nurses at [phone], option 3.        HAMMERTOES  Hammertoe is a contracture (bending) of one or both joints of the second, third, fourth, or fifth (little) toes. This abnormal bending can put pressure on the toe when wearing shoes, causing problems to develop.  Hammertoes usually start out as mild deformities and get progressively worse over time. In the earlier stages, hammertoes are flexible and the symptoms can often be managed with noninvasive measures. But if left untreated, hammertoes can become more rigid and will not respond to non-surgical treatment.  Because of the progressive nature of hammertoes, they should receive early attention. Hammertoes never get better without some kind of intervention.  CAUSES  The most common cause of hammertoe is a muscle/tendon imbalance. This imbalance, which leads to a bending of the toe, results from mechanical (structural) changes in the foot that occur over time in some people.  Hammertoes may be aggravated by shoes that don t fit properly. A hammertoe may result if a toe is too long and is forced into a cramped position when a tight shoe is worn.  Occasionally, hammertoe is the result of an earlier trauma to the toe. In some people, hammertoes are inherited.  SYMPTOMS  Pain or irritation of the affected toe when wearing shoes.   Corns and calluses (a buildup of skin) on the toe, between two toes, or on the ball of the foot. Corns are caused by constant friction against the shoe. They may be soft or hard, depending upon their location.   Inflammation, redness, or a burning sensation   Contracture of the toe   In more severe cases of hammertoe, open sores may form.   DIAGNOSIS  Although hammertoes are readily apparent, to arrive at a diagnosis the foot and ankle surgeon will obtain a " thorough history of your symptoms and examine your foot. During the physical examination, the doctor may attempt to reproduce your symptoms by manipulating your foot and will study the contractures of the toes. In addition, the foot and ankle surgeon may take x-rays to determine the degree of the deformities and assess any changes that may have occurred.   Hammertoes are progressive - they don t go away by themselves and usually they will get worse over time. However, not all cases are alike - some hammertoes progress more rapidly than others. Once your foot and ankle surgeon has evaluated your hammertoes, a treatment plan can be developed that is suited to your needs.  NON-SURGICAL TREATMENT  There is a variety of treatment options for hammertoe. The treatment your foot and ankle surgeon selects will depend upon the severity of your hammertoe and other factors.  A number of non-surgical measures can be undertaken:  Padding corns and calluses. Your foot and ankle surgeon can provide or prescribe pads designed to shield corns from irritation. If you want to try over-the-counter pads, avoid the medicated types. Medicated pads are generally not recommended because they may contain a small amount of acid that can be harmful. Consult your surgeon about this option.   Changes in shoewear. Avoid shoes with pointed toes, shoes that are too short, or shoes with high heels - conditions that can force your toe against the front of the shoe. Instead, choose comfortable shoes with a deep, roomy toe box and heels no higher than two inches.   Orthotic devices. A custom orthotic device placed in your shoe may help control the muscle/tendon imbalance.   Injection therapy. Corticosteroid injections are sometimes used to ease pain and inflammation caused by hammertoe.   Medications. Oral nonsteroidal anti-inflammatory drugs (NSAIDs), such as ibuprofen, may be recommended to reduce pain and inflammation.   Splinting/strapping. Splints  or small straps may be applied by the surgeon to realign the bent toe.   Exercises:   1. The Toe Tap  Stand flat on the ground in your bare feet. Raise all of your toes up off the ground as high as you can. Then starting with the little toes, slowly press them down to the ground. After the big toes are on the ground, start over by raising all of them up off the ground again. This motion is similar to tapping your fingers on a desk. Repeat this ten times.     2. Interlocking your Fingers and Toes  Cross your right foot over your knee and place the fingers of your left hand between your toes. Squeeze your toes together, pinching your fingers between them. Spread the toes apart and squeeze them together again. Repeat this ten times then do the other foot. Like most exercises, this will get easier the more you do it. If you are having a lot of difficulty with this exercise, start with just your index finger between your first and second toe, then later add your middle finger between your second and third toes, and so on until you can fit all your fingers between your toes. Do this ten times on each foot. Eventually you will be able to spread your toes apart without using your fingers.    3. Gripping the Floor   the floor by pressing the pads of your toes (not the tips) into the floor without curling your toes. Relax and repeat this ten times. If your shoes have the proper amount of depth, you should be able to do this with shoes on.    HAMMERTOE TOE SURGERY   Hammertoe surgery is complex. The surgical procedure is an attempt to help the toe lay in a better position. Nearly every structure in the toe will be cut including the tendons, ligaments, skin and bone. Hammertoes are a complex deformity and final toe position is difficult to predict. The only sure way to position a toe is to fuse all three digital joints. That will not happen as some degree of toe motion is needed for walking. The toe may not be completely  reduced as the surrounding skin and other structures may not allow the toe to return to a normal position. The tendons on adjacent toes may need to be cut at the time of the original or subsequent surgeries, as interconnections exist between the toes. The toe may drift after surgery. Stiffness may develop leading to new areas of pressure.   Future shoe choices will be critical in allowing the surgery to provide comfort. The toes will still hurt if shoes rub. The original pain may also persist as other foot problems may be contributing to the current pain. The toe may or may not be pinned in place. External pins would require complete avoidance of water on the foot for six weeks. The pin would be removed about six weeks after the surgery. Strict attention to protection is critical. The pin could get bumped or loosen resulting in early removal. Removal might be necessary before the bone heals which would negatively affect the final surgical outcome and toe allignment.       www.[employer].com or call 9-643-PED"[employer]  TOE COVERS/CAPS

## 2024-03-07 NOTE — LETTER
"    3/7/2024         RE: Nadiya Drummond  [address] [facility] [address]        Dear Colleague,    Thank you for referring your patient, Nadiya Drummond, to the [facility] PODIATRY. Please see a copy of my visit note below.    Foot & Ankle Surgery  March 7, 2024    CC: \"Have a hammertoe + 2 bunions\"    I was asked to see Nadiya Drummond regarding the chief complaint by: Self    HPI:  Pt is a 83 year old female who presents with above complaint.  Multiple year history of above issues.  She points to the right second hammertoe and bone spurs at the dorsal midfoot bilateral (these are the bunion she refers to).  Describes burning and aching pain, 8 out of 10, worse with weightbearing.  She had right tailor's bunion surgery and left second hammertoe surgery \"12 to 15 years ago\".  Now the right second toe is painful and the dorsal midfoot spurs are bothersome.  No treatment thus far    ROS:   Pos for CC.  The patient denies current nausea, vomiting, chills, fevers, belly pain, calf pain, chest pain or SOB.  Complete remainder of ROS is otherwise neg.    VITALS:    Vitals:    03/07/24 0857   BP: (!) 159/58   Weight: 63.5 kg (140 lb)       PMH:    Past Medical History:   Diagnosis Date     Lactose intolerance      TMJ (temporomandibular joint disorder)      Tobacco abuse        SXHX:    Past Surgical History:   Procedure Laterality Date     BLEPHAROPLASTY Bilateral 01/01/2002     BUNIONECTOMY Left 01/01/2007     CATARACT IOL, RT/LT Bilateral 01/01/2007        MEDS:    Current Outpatient Medications   Medication     Multiple Vitamins-Minerals (OCUVITE PO)     No current facility-administered medications for this visit.       ALL:     Allergies   Allergen Reactions     Alendronate Muscle Pain (Myalgia)       FMH:    Family History   Problem Relation Age of Onset     Heart Disease Mother      Heart Disease Father      LUNG DISEASE Sister         unclear, asthma like     Colon Cancer Sister 81     " Diabetes Type 2  Sister         resolved after colon cancer surgery     Alcohol/Drug Brother         lived in [facility]     Diabetes Type 1 Daughter      Allergies Daughter      Diabetes Type 1 Grandchild        SocHx:    Social History     Socioeconomic History     Marital status: Single     Spouse name: Not on file     Number of children: 3     Years of education: Not on file     Highest education level: Not on file   Occupational History     Not on file   Tobacco Use     Smoking status: Former     Packs/day: 0.25     Years: 40.00     Additional pack years: 0.00     Total pack years: 10.00     Types: Cigarettes     Start date: 1965     Quit date: 2023     Years since quittin.0     Passive exposure: Past     Smokeless tobacco: Never     Tobacco comments:     2 pks. weekly   Vaping Use     Vaping Use: Never used   Substance and Sexual Activity     Alcohol use: No     Alcohol/week: 0.0 standard drinks of alcohol     Drug use: No     Sexual activity: Yes     Partners: Male   Other Topics Concern      Service No     Blood Transfusions No     Caffeine Concern Not Asked     Occupational Exposure Not Asked     Hobby Hazards Not Asked     Sleep Concern Not Asked     Stress Concern Not Asked     Weight Concern Not Asked     Special Diet Not Asked     Back Care Not Asked     Exercise No     Bike Helmet Not Asked     Seat Belt Yes     Self-Exams No     Parent/sibling w/ CABG, MI or angioplasty before 65F 55M? Not Asked   Social History Narrative    07-    Balanced Diet - Yes    Osteoporosis Prevention Measures - Dairy servings per day: 1 and Medication/Supplements (See current meds)    Regular Exercise -  No Describe     Dental Exam - has false teeth,got them  about 9 months ago    Eye Exam -     Self Breast Exam - No    Abuse: Current or Past (Physical, Sexual or Emotional)- No    Do you feel safe in your environment - Yes    Guns stored in the home - Yes    Sunscreen used - No    Seatbelts used -  Yes    Lipids -      Glucose -      Colon Cancer Screening - Flexible Sigmoidoscopy about 5 years ago, at the [facility](date completed)    Hemoccults - NO    Pap Test -  About 3 years ago    Do you have any concerns about STD's -  No    Mammography - about 3 years ago    DEXA - about 3 years ago    Immunizations reviewed and up to date - pt. Thinks she had a TD, shot a year ago             Social Determinants of Health     Financial Resource Strain: Low Risk  (1/15/2024)    Financial Resource Strain      Within the past 12 months, have you or your family members you live with been unable to get utilities (heat, electricity) when it was really needed?: No   Food Insecurity: Low Risk  (1/15/2024)    Food Insecurity      Within the past 12 months, did you worry that your food would run out before you got money to buy more?: No      Within the past 12 months, did the food you bought just not last and you didn t have money to get more?: No   Transportation Needs: Low Risk  (1/15/2024)    Transportation Needs      Within the past 12 months, has lack of transportation kept you from medical appointments, getting your medicines, non-medical meetings or appointments, work, or from getting things that you need?: No   Physical Activity: Not on file   Stress: Not on file   Social Connections: Not on file   Interpersonal Safety: Low Risk  (1/19/2024)    Interpersonal Safety      Do you feel physically and emotionally safe where you currently live?: Yes      Within the past 12 months, have you been hit, slapped, kicked or otherwise physically hurt by someone?: No      Within the past 12 months, have you been humiliated or emotionally abused in other ways by your partner or ex-partner?: No   Housing Stability: Low Risk  (1/15/2024)    Housing Stability      Do you have housing? : Yes      Are you worried about losing your housing?: No           EXAMINATION:  Gen:   No apparent distress  Neuro:   A&Ox3, no  "deficits  Psych:    Answering questions appropriately for age and situation with normal affect  Head:    NCAT  Eye:    Visual scanning without deficit  Ear:    Response to auditory stimuli wnl  Lung:    Non-labored breathing on RA noted  Abd:    NTND per patient report  Lymph:    Neg for pitting/non-pitting edema BLE  Vasc:    Pulses palpable, CFT minimally delayed  Neuro:    Light touch sensation intact to all sensory nerve distributions without paresthesias  Derm:    Neg for nodules, lesions or ulcerations  MSK:   Lesser digital hammertoes are seen.  Specifically, the right second hammertoe presents with a fixed PIPJ flexion.  Prominent dorsal exostoses near the tarsometatarsal joints bilateral  Calf:    Neg for redness, swelling or tenderness    Assessment:  83 year old female with painful right second hammertoe; prominent dorsal exostosis bilaterally of the first tarsometatarsal joint      Medical Decision Making/Plan:  Discussed etiologies, anatomy and options  1.  Painful right second hammertoe  -I personally reviewed and interpreted the patient's lower extremity history pertinent to today's visit, including imaging/labs, in preparation for initiating a treatment program.  -Our hammertoe handout was dispensed  -Conservatively, we discussed accommodative shoe gear and padding/spacer/splinting options if this fails to control symptoms, we discussed surgical  -Intervention in the form of PIPJ arthrodesis.  We reviewed the procedure and a generic postop recovery for patient information and planning  -Our surgery scheduling handout was dispensed    2.  Prominent painful dorsal midfoot exostoses bilateral  -Again, these are the \"bunions\" that she is referring to  -Conservatively, we discussed accommodative shoes, padding and RICE/NSAID options  -Surgically, we discussed exostectomies as well as a generic postop recovery for patient information and plan  -I think it would be reasonable to address both of these at the " same time as the hammertoe to minimize downtime and recovery time    Job duties; time off work - retired;   Smoking history - neg  Vit D - n/a  Diabetic/A1c - neg  Hemoglobin - draw prior  Clot history - neg/neg  Allergies to surgical implants/suture - neg  Allergies/issues with narcotics - neg    Procedure(s):  1.  Right 2nd hammertoe correction; 2.  Bilateral bone spur resection  Consent: above  Diagnosis:  hammertoe  Equipment/Vendor: arthrex hammertoe    Pain Med Plan:  norco, atarax, ibuprofen  DVT prophylaxis:  Mechanical,   WB Status Post-op:  heel WB with crutches  WB Device:  shoe, crutches  Vit D Supplementation:  2000U        Follow up:  prn or sooner with acute issues      Patient's medical history was reviewed today      Justin Sanchez DPM FACFAS FACFAOM  Podiatric Foot & Ankle Surgeon  [facility]  [phone]    Disclaimer: This note consists of symbols derived from keyboarding, dictation and/or voice recognition software. As a result, there may be errors in the script that have gone undetected. Please consider this when interpreting information found in this chart.          Again, thank you for allowing me to participate in the care of your patient.        Sincerely,        Justin Sanchez DPM, MARIE

## 2024-03-11 ENCOUNTER — TELEPHONE (OUTPATIENT)
Dept: PODIATRY | Facility: CLINIC | Age: 84
End: 2024-03-11
Payer: COMMERCIAL

## 2024-03-11 ENCOUNTER — PREP FOR PROCEDURE (OUTPATIENT)
Dept: PODIATRY | Facility: CLINIC | Age: 84
End: 2024-03-11
Payer: COMMERCIAL

## 2024-03-11 DIAGNOSIS — M20.41 HAMMERTOE OF RIGHT FOOT: Primary | ICD-10-CM

## 2024-03-11 NOTE — TELEPHONE ENCOUNTER
Nadiya called to schedule surgery    Please add surgery case if needed    Thank you    Benita PANDYA  [facility]  Surgery Schedule Coordinator

## 2024-03-11 NOTE — PROGRESS NOTES
"Order signed for \"1.  Hammertoe repair right second toe; 2.  Bilateral bone spur resection\"    MAC    Supine    Arthrex hammertoe implant    Justin Sanchez DPM FACFAS FACFAOM  Podiatric Foot & Ankle Surgeon  [facility]  [phone]    "

## 2024-03-19 ENCOUNTER — TELEPHONE (OUTPATIENT)
Dept: PODIATRY | Facility: CLINIC | Age: 84
End: 2024-03-19

## 2024-03-19 NOTE — TELEPHONE ENCOUNTER
Patient has been scheduled for surgery. Details are below.    Date of Surgery: 04/24/24    Approximate Arrival Time: SURGERY CENTER WILL CALL 3/4 DAYS PRIOR TO CONFIRM A TIME   Surgeon: Dr. Justin Sanchez    Procedure: . HAMMERTOE REPAIR RIGHT 2ND TOE; 2. BILATERAL BONE SPUR RESECTION - Bilateral     Location: [facility] [address] [address] [address]  Surgery Consult: NA  PreOp Physical: 04/04/24  PostOp: 05/02 & 05/09/24  Packet Mailed/MyChart Sent: YES  Added to [place]: YES    Spoke to: VENTURA

## 2024-03-27 ENCOUNTER — HOSPITAL ENCOUNTER (OUTPATIENT)
Dept: BONE DENSITY | Facility: CLINIC | Age: 84
Discharge: HOME | End: 2024-03-27
Attending: FAMILY MEDICINE | Admitting: FAMILY MEDICINE
Payer: COMMERCIAL

## 2024-03-27 DIAGNOSIS — Z00.00 ENCOUNTER FOR MEDICARE ANNUAL WELLNESS EXAM: ICD-10-CM

## 2024-03-27 DIAGNOSIS — N95.8 OTHER SPECIFIED MENOPAUSAL AND PERIMENOPAUSAL DISORDERS: ICD-10-CM

## 2024-03-27 PROCEDURE — 77080 DXA BONE DENSITY AXIAL: CPT

## 2024-03-28 NOTE — RESULT ENCOUNTER NOTE
Hello!  It was a pleasure to see you in clinic!  Thank you for getting your bone scan   done.     The bone density test does show that you have Osteoporosis( thin bones).  There are some things you can to do prevent further loss of bone mass.    You should be taking 4726-0743 mg of calcium with 400-800 Units of Vitamin D daily, which I believe you are already doing.    You should be exercising regularly; I recommend performing weight bearing exercises for 30 minutes 2-3 times per week, and getting regular aerobic exercise for 30 minutes 2-3 days per week.  Exercise is as effective as medi  cine for maintaining bone mass.    Please let me know or schedule an appointment if you have any questions or if you would like to discuss this further.  You could consider medication, but I would prefer to discuss this in clinic, so please schedule an a  ppointment to review this at your convenience.    I recommend rechecking your bone density in 2 years.    Sincerely,    NANDO RAO MD   3/28/2024

## 2024-04-04 ENCOUNTER — OFFICE VISIT (OUTPATIENT)
Dept: FAMILY MEDICINE | Facility: CLINIC | Age: 84
End: 2024-04-04
Payer: COMMERCIAL

## 2024-04-04 VITALS
BODY MASS INDEX: 22.99 KG/M2 | WEIGHT: 138 LBS | OXYGEN SATURATION: 100 % | SYSTOLIC BLOOD PRESSURE: 131 MMHG | DIASTOLIC BLOOD PRESSURE: 67 MMHG | HEIGHT: 65 IN | RESPIRATION RATE: 16 BRPM | TEMPERATURE: 97.9 F | HEART RATE: 62 BPM

## 2024-04-04 DIAGNOSIS — Z01.818 PREOP GENERAL PHYSICAL EXAM: Primary | ICD-10-CM

## 2024-04-04 DIAGNOSIS — M77.50 BONE SPUR OF FOOT: ICD-10-CM

## 2024-04-04 DIAGNOSIS — M20.41 HAMMER TOE OF RIGHT FOOT: ICD-10-CM

## 2024-04-04 DIAGNOSIS — M85.89 OSTEOPENIA OF MULTIPLE SITES: ICD-10-CM

## 2024-04-04 PROCEDURE — 99214 OFFICE O/P EST MOD 30 MIN: CPT | Performed by: NURSE PRACTITIONER

## 2024-04-04 NOTE — PATIENT INSTRUCTIONS
Preparing for Your Surgery  Getting started  A nurse will call you to review your health history and instructions. They will give you an arrival time based on your scheduled surgery time. Please be ready to share:  Your doctor's clinic name and phone number  Your medical, surgical, and anesthesia history  A list of allergies and sensitivities  A list of medicines, including herbal treatments and over-the-counter drugs  Whether the patient has a legal guardian (ask how to send us the papers in advance)  Please tell us if you're pregnant--or if there's any chance you might be pregnant. Some surgeries may injure a fetus (unborn baby), so they require a pregnancy test. Surgeries that are safe for a fetus don't always need a test, and you can choose whether to have one.   If you have a child who's having surgery, please ask for a copy of Preparing for Your Child's Surgery.    Preparing for surgery  Within 10 to 30 days of surgery: Have a pre-op exam (sometimes called an H&P, or History and Physical). This can be done at a clinic or pre-operative center.  If you're having a , you may not need this exam. Talk to your care team.  At your pre-op exam, talk to your care team about all medicines you take. If you need to stop any medicines before surgery, ask when to start taking them again.  We do this for your safety. Many medicines can make you bleed too much during surgery. Some change how well surgery (anesthesia) drugs work.  Call your insurance company to let them know you're having surgery. (If you don't have insurance, call [phone].)  Call your clinic if there's any change in your health. This includes signs of a cold or flu (sore throat, runny nose, cough, rash, fever). It also includes a scrape or scratch near the surgery site.  If you have questions on the day of surgery, call your hospital or surgery center.  Eating and drinking guidelines  For your safety: Unless your surgeon tells you otherwise,  follow the guidelines below.  Eat and drink as usual until 8 hours before you arrive for surgery. After that, no food or milk.  Drink clear liquids until 2 hours before you arrive. These are liquids you can see through, like water, Gatorade, and Propel Water. They also include plain black coffee and tea (no cream or milk), candy, and breath mints. You can spit out gum when you arrive.  If you drink alcohol: Stop drinking it the night before surgery.  If your care team tells you to take medicine on the morning of surgery, it's okay to take it with a sip of water.  Preventing infection  Shower or bathe the night before and morning of your surgery. Follow the instructions your clinic gave you. (If no instructions, use regular soap.)  Don't shave or clip hair near your surgery site. We'll remove the hair if needed.  Don't smoke or vape the morning of surgery. You may chew nicotine gum up to 2 hours before surgery. A nicotine patch is okay.  Note: Some surgeries require you to completely quit smoking and nicotine. Check with your surgeon.  Your care team will make every effort to keep you safe from infection. We will:  Clean our hands often with soap and water (or an alcohol-based hand rub).  Clean the skin at your surgery site with a special soap that kills germs.  Give you a special gown to keep you warm. (Cold raises the risk of infection.)  Wear special hair covers, masks, gowns and gloves during surgery.  Give antibiotic medicine, if prescribed. Not all surgeries need antibiotics.  What to bring on the day of surgery  Photo ID and insurance card  Copy of your health care directive, if you have one  Glasses and hearing aids (bring cases)  You can't wear contacts during surgery  Inhaler and eye drops, if you use them (tell us about these when you arrive)  CPAP machine or breathing device, if you use them  A few personal items, if spending the night  If you have . . .  A pacemaker, ICD (cardiac defibrillator) or other  implant: Bring the ID card.  An implanted stimulator: Bring the remote control.  A legal guardian: Bring a copy of the certified (court-stamped) guardianship papers.  Please remove any jewelry, including body piercings. Leave jewelry and other valuables at home.  If you're going home the day of surgery  You must have a responsible adult drive you home. They should stay with you overnight as well.  If you don't have someone to stay with you, and you aren't safe to go home alone, we may keep you overnight. Insurance often won't pay for this.  After surgery  If it's hard to control your pain or you need more pain medicine, please call your surgeon's office.  Questions?   If you have any questions for your care team, list them here: _________________________________________________________________________________________________________________________________________________________________________ ____________________________________ ____________________________________ ____________________________________  For informational purposes only. Not to replace the advice of your health care provider. Copyright   2003, 2019 [employer]. All rights reserved. Clinically reviewed by Amanda Turner MD. [employer] 634423 - REV 12/22.    How to Take Your Medication Before Surgery  Hold your supplements the morning of surgery.

## 2024-04-04 NOTE — PROGRESS NOTES
Preoperative Evaluation  [address]  [address]  [place] [address]  Phone: [phone]  Fax: [phone]  Primary Provider: Carmen Beckett  Pre-op Performing Provider: KITTY BONNER  Apr 4, 2024       Nadiya is a 83 year old, presenting for the following:  Pre-Op Exam        4/4/2024     8:38 AM   Additional Questions   Roomed by Media     Surgical Information  Surgery/Procedure: 1.  HAMMERTOE REPAIR RIGHT 2ND TOE; 2.  BILATERAL BONE SPUR RESECTION   Surgery Location: [facility]  Surgeon: Justin Sanchez DPM   Surgery Date: 04/24/2024  Time of Surgery: To Be Determined   Where patient plans to recover: At home with family  Fax number for surgical facility: Note does not need to be faxed, will be available electronically in Epic.    Assessment & Plan     The proposed surgical procedure is considered INTERMEDIATE risk.    Preop general physical exam  Reviewed medical/social/family history and health maintenance    Hammer toe of right foot  Bone spur of foot      Osteopenia of multiple sites  Stable on supplements.              - No identified additional risk factors other than previously addressed    Antiplatelet or Anticoagulation Medication Instructions   - Patient is on no antiplatelet or anticoagulation medications.    Additional Medication Instructions  Patient is on no additional chronic medications    Recommendation  APPROVAL GIVEN to proceed with proposed procedure, without further diagnostic evaluation.          Subjective       HPI related to upcoming procedure: 1.  HAMMERTOE REPAIR RIGHT 2ND TOE; 2.  BILATERAL BONE SPUR RESECTION         4/3/2024     7:39 AM   Preop Questions   1. Have you ever had a heart attack or stroke? No   2. Have you ever had surgery on your heart or blood vessels, such as a stent placement, a coronary artery bypass, or surgery on an artery in your head, neck, heart, or legs? No   3. Do you  have chest pain with activity? No   4. Do you have a history of  heart failure? No   5. Do you currently have a cold, bronchitis or symptoms of other infection? No   6. Do you have a cough, shortness of breath, or wheezing? No   7. Do you or anyone in your family have previous history of blood clots? No   8. Do you or does anyone in your family have a serious bleeding problem such as prolonged bleeding following surgeries or cuts? No   9. Have you ever had problems with anemia or been told to take iron pills? No   10. Have you had any abnormal blood loss such as black, tarry or bloody stools, or abnormal vaginal bleeding? No   11. Have you ever had a blood transfusion? No   12. Are you willing to have a blood transfusion if it is medically needed before, during, or after your surgery? Yes   13. Have you or any of your relatives ever had problems with anesthesia? No   14. Do you have sleep apnea, excessive snoring or daytime drowsiness? No   15. Do you have any artifical heart valves or other implanted medical devices like a pacemaker, defibrillator, or continuous glucose monitor? No   16. Do you have artificial joints? No   17. Are you allergic to latex? No       Health Care Directive  Patient does not have a Health Care Directive or Living Will: Discussed advance care planning with patient; information given to patient to review. If she has a completed copy at home to bring in     Preoperative Review of    reviewed - no record of controlled substances prescribed.          Patient Active Problem List    Diagnosis Date Noted    Full dentures 10/08/2019     Priority: Medium    Ganglion cyst of both feet 10/08/2019     Priority: Medium    Osteopenia of multiple sites 10/08/2019     Priority: Medium    Cataract 06/02/2014     Priority: Medium    Chronic maxillary sinusitis 04/03/2013     Priority: Medium    Onychomycosis 09/17/2012     Priority: Medium    Hammer toe 09/17/2012     Priority: Medium    Lipoma of  "skin and subcutaneous tissue 2012     Priority: Medium    Advanced care planning/counseling discussion 2012     Priority: Medium    Localized osteoporosis without current pathological fracture 2012     Priority: Medium    Health Care Home 2012     Priority: Medium     FPA [employer] for .  Claudia Diane RN-BSN, [facility]  [phone]   DX V65.8 REPLACED WITH 53[facility] C[facility] (2013)      CARDIOVASCULAR SCREENING; LDL GOAL LESS THAN 130 2010     Priority: Medium      Past Medical History:   Diagnosis Date    Lactose intolerance     TMJ (temporomandibular joint disorder)     Tobacco abuse      Past Surgical History:   Procedure Laterality Date    BLEPHAROPLASTY Bilateral 2002    BUNIONECTOMY Left 2007    CATARACT IOL, RT/LT Bilateral 2007    ORTHOPEDIC SURGERY       Current Outpatient Medications   Medication Sig Dispense Refill    CALCIUM-VITAMIN D PO       Multiple Vitamins-Minerals (OCUVITE PO) Eye vitamins daily         Allergies   Allergen Reactions    Alendronate Muscle Pain (Myalgia)        Social History     Tobacco Use    Smoking status: Former     Packs/day: 0.00     Years: 40.00     Additional pack years: 0.00     Total pack years: 0.00     Types: Cigarettes     Start date: 1965     Quit date: 2022     Years since quittin.8     Passive exposure: Past    Smokeless tobacco: Never    Tobacco comments:     no comment   Substance Use Topics    Alcohol use: No       History   Drug Use No         Review of Systems      Objective    /67   Pulse 62   Temp 97.9  F (36.6  C) (Oral)   Resp 16   Ht 1.638 m (5' 4.5\")   Wt 62.6 kg (138 lb)   SpO2 100%   BMI 23.32 kg/m     Estimated body mass index is 23.32 kg/m  as calculated from the following:    Height as of this encounter: 1.638 m (5' 4.5\").    Weight as of this encounter: 62.6 kg (138 lb).  Physical Exam  GENERAL: alert and no distress  EYES: Eyes grossly normal to " inspection, PERRL and conjunctivae and sclerae normal  HENT: ear canals and TM's normal, nose and mouth without ulcers or lesions  NECK: no adenopathy, no asymmetry, masses, or scars  RESP: lungs clear to auscultation - no rales, rhonchi or wheezes  CV: regular rate and rhythm, normal S1 S2, no S3 or S4, no murmur, click or rub, no peripheral edema  ABDOMEN: soft, nontender, no hepatosplenomegaly, no masses and bowel sounds normal  MS: no gross musculoskeletal defects noted, no edema  NEURO: Normal strength and tone, mentation intact and speech normal    Recent Labs   Lab Test 10/06/23  1009 04/13/23  1355   HGB 12.6 12.0    268   NA  --  140   POTASSIUM  --  4.1   CR  --  0.71        Diagnostics  No labs were ordered during this visit.   No EKG required for low risk surgery (cataract, skin procedure, breast biopsy, etc).  No EKG required, no history of coronary heart disease, significant arrhythmia, peripheral arterial disease or other structural heart disease.    Revised Cardiac Risk Index (RCRI)  The patient has the following serious cardiovascular risks for perioperative complications:   - No serious cardiac risks = 0 points     RCRI Interpretation: 0 points: Class I (very low risk - 0.4% complication rate)         Signed Electronically by: DARWIN Plunkett CNP  Copy of this evaluation report is provided to requesting physician.

## 2024-04-23 ENCOUNTER — ANESTHESIA EVENT (OUTPATIENT)
Dept: SURGERY | Facility: CLINIC | Age: 84
End: 2024-04-23
Payer: COMMERCIAL

## 2024-04-24 ENCOUNTER — APPOINTMENT (OUTPATIENT)
Dept: GENERAL RADIOLOGY | Facility: CLINIC | Age: 84
End: 2024-04-24
Attending: PODIATRIST
Payer: COMMERCIAL

## 2024-04-24 ENCOUNTER — ANESTHESIA (OUTPATIENT)
Dept: SURGERY | Facility: CLINIC | Age: 84
End: 2024-04-24
Payer: COMMERCIAL

## 2024-04-24 ENCOUNTER — HOSPITAL ENCOUNTER (OUTPATIENT)
Facility: CLINIC | Age: 84
Discharge: HOME | End: 2024-04-24
Attending: PODIATRIST | Admitting: PODIATRIST
Payer: COMMERCIAL

## 2024-04-24 VITALS
DIASTOLIC BLOOD PRESSURE: 54 MMHG | HEIGHT: 64 IN | SYSTOLIC BLOOD PRESSURE: 151 MMHG | WEIGHT: 138.1 LBS | TEMPERATURE: 98.1 F | OXYGEN SATURATION: 99 % | RESPIRATION RATE: 18 BRPM | HEART RATE: 57 BPM | BODY MASS INDEX: 23.58 KG/M2

## 2024-04-24 DIAGNOSIS — Z98.890 S/P BILATERAL FOOT SURGERY: Primary | ICD-10-CM

## 2024-04-24 PROCEDURE — 28122 PARTIAL REMOVAL OF FOOT BONE: CPT | Mod: RT | Performed by: PODIATRIST

## 2024-04-24 PROCEDURE — 999N000141 HC STATISTIC PRE-PROCEDURE NURSING ASSESSMENT: Performed by: PODIATRIST

## 2024-04-24 PROCEDURE — 258N000003 HC RX IP 258 OP 636: Performed by: NURSE ANESTHETIST, CERTIFIED REGISTERED

## 2024-04-24 PROCEDURE — 250N000011 HC RX IP 250 OP 636: Performed by: NURSE ANESTHETIST, CERTIFIED REGISTERED

## 2024-04-24 PROCEDURE — C1713 ANCHOR/SCREW BN/BN,TIS/BN: HCPCS | Performed by: PODIATRIST

## 2024-04-24 PROCEDURE — 250N000009 HC RX 250: Performed by: PODIATRIST

## 2024-04-24 PROCEDURE — 360N000083 HC SURGERY LEVEL 3 W/ FLUORO, PER MIN: Performed by: PODIATRIST

## 2024-04-24 PROCEDURE — 999N000054 HC STATISTIC EKG NON-CHARGEABLE

## 2024-04-24 PROCEDURE — 999N000065 XR FOOT PORT RIGHT 2 VIEWS: Mod: RT

## 2024-04-24 PROCEDURE — 28285 REPAIR OF HAMMERTOE: CPT | Mod: 51 | Performed by: PODIATRIST

## 2024-04-24 PROCEDURE — 250N000009 HC RX 250: Performed by: NURSE ANESTHETIST, CERTIFIED REGISTERED

## 2024-04-24 PROCEDURE — 28122 PARTIAL REMOVAL OF FOOT BONE: CPT | Performed by: ANESTHESIOLOGY

## 2024-04-24 PROCEDURE — 28122 PARTIAL REMOVAL OF FOOT BONE: CPT | Performed by: NURSE ANESTHETIST, CERTIFIED REGISTERED

## 2024-04-24 PROCEDURE — 710N000009 HC RECOVERY PHASE 1, LEVEL 1, PER MIN: Performed by: PODIATRIST

## 2024-04-24 PROCEDURE — 99100 ANES PT EXTEME AGE<1 YR&>70: CPT | Performed by: NURSE ANESTHETIST, CERTIFIED REGISTERED

## 2024-04-24 PROCEDURE — 999N000179 XR SURGERY CARM FLUORO LESS THAN 5 MIN W STILLS

## 2024-04-24 PROCEDURE — 370N000017 HC ANESTHESIA TECHNICAL FEE, PER MIN: Performed by: PODIATRIST

## 2024-04-24 PROCEDURE — 710N000012 HC RECOVERY PHASE 2, PER MINUTE: Performed by: PODIATRIST

## 2024-04-24 PROCEDURE — 272N000001 HC OR GENERAL SUPPLY STERILE: Performed by: PODIATRIST

## 2024-04-24 PROCEDURE — 250N000011 HC RX IP 250 OP 636: Performed by: PODIATRIST

## 2024-04-24 PROCEDURE — 93005 ELECTROCARDIOGRAM TRACING: CPT

## 2024-04-24 PROCEDURE — 271N000001 HC OR GENERAL SUPPLY NON-STERILE: Performed by: PODIATRIST

## 2024-04-24 DEVICE — IMPLANTABLE DEVICE: Type: IMPLANTABLE DEVICE | Site: FOOT | Status: FUNCTIONAL

## 2024-04-24 RX ORDER — HYDROCODONE BITARTRATE AND ACETAMINOPHEN 5; 325 MG/1; MG/1
1 TABLET ORAL
Status: DISCONTINUED | OUTPATIENT
Start: 2024-04-24 | End: 2024-04-24 | Stop reason: HOSPADM

## 2024-04-24 RX ORDER — NALOXONE HYDROCHLORIDE 0.4 MG/ML
0.1 INJECTION, SOLUTION INTRAMUSCULAR; INTRAVENOUS; SUBCUTANEOUS
Status: DISCONTINUED | OUTPATIENT
Start: 2024-04-24 | End: 2024-04-24 | Stop reason: HOSPADM

## 2024-04-24 RX ORDER — PROPOFOL 10 MG/ML
INJECTION, EMULSION INTRAVENOUS CONTINUOUS PRN
Status: DISCONTINUED | OUTPATIENT
Start: 2024-04-24 | End: 2024-04-24

## 2024-04-24 RX ORDER — HYDROXYZINE HYDROCHLORIDE 10 MG/1
TABLET, FILM COATED ORAL
Qty: 15 TABLET | Refills: 0 | Status: SHIPPED | OUTPATIENT
Start: 2024-04-24 | End: 2024-06-04

## 2024-04-24 RX ORDER — FENTANYL CITRATE 50 UG/ML
50 INJECTION, SOLUTION INTRAMUSCULAR; INTRAVENOUS EVERY 5 MIN PRN
Status: DISCONTINUED | OUTPATIENT
Start: 2024-04-24 | End: 2024-04-24 | Stop reason: HOSPADM

## 2024-04-24 RX ORDER — CEFAZOLIN SODIUM/WATER 2 G/20 ML
2 SYRINGE (ML) INTRAVENOUS
Status: COMPLETED | OUTPATIENT
Start: 2024-04-24 | End: 2024-04-24

## 2024-04-24 RX ORDER — HYDRALAZINE HYDROCHLORIDE 20 MG/ML
2.5-5 INJECTION INTRAMUSCULAR; INTRAVENOUS EVERY 10 MIN PRN
Status: DISCONTINUED | OUTPATIENT
Start: 2024-04-24 | End: 2024-04-24 | Stop reason: HOSPADM

## 2024-04-24 RX ORDER — HYDROMORPHONE HCL IN WATER/PF 6 MG/30 ML
0.2 PATIENT CONTROLLED ANALGESIA SYRINGE INTRAVENOUS EVERY 5 MIN PRN
Status: DISCONTINUED | OUTPATIENT
Start: 2024-04-24 | End: 2024-04-24 | Stop reason: HOSPADM

## 2024-04-24 RX ORDER — SODIUM CHLORIDE, SODIUM LACTATE, POTASSIUM CHLORIDE, CALCIUM CHLORIDE 600; 310; 30; 20 MG/100ML; MG/100ML; MG/100ML; MG/100ML
INJECTION, SOLUTION INTRAVENOUS CONTINUOUS PRN
Status: DISCONTINUED | OUTPATIENT
Start: 2024-04-24 | End: 2024-04-24

## 2024-04-24 RX ORDER — ONDANSETRON 2 MG/ML
INJECTION INTRAMUSCULAR; INTRAVENOUS PRN
Status: DISCONTINUED | OUTPATIENT
Start: 2024-04-24 | End: 2024-04-24

## 2024-04-24 RX ORDER — BUPIVACAINE HYDROCHLORIDE 2.5 MG/ML
INJECTION, SOLUTION EPIDURAL; INFILTRATION; INTRACAUDAL PRN
Status: DISCONTINUED | OUTPATIENT
Start: 2024-04-24 | End: 2024-04-24 | Stop reason: HOSPADM

## 2024-04-24 RX ORDER — HYDROMORPHONE HCL IN WATER/PF 6 MG/30 ML
0.4 PATIENT CONTROLLED ANALGESIA SYRINGE INTRAVENOUS EVERY 5 MIN PRN
Status: DISCONTINUED | OUTPATIENT
Start: 2024-04-24 | End: 2024-04-24 | Stop reason: HOSPADM

## 2024-04-24 RX ORDER — LIDOCAINE HYDROCHLORIDE 20 MG/ML
INJECTION, SOLUTION INFILTRATION; PERINEURAL PRN
Status: DISCONTINUED | OUTPATIENT
Start: 2024-04-24 | End: 2024-04-24

## 2024-04-24 RX ORDER — CHOLECALCIFEROL (VITAMIN D3) 50 MCG
1 TABLET ORAL DAILY
Qty: 90 TABLET | Refills: 0 | Status: SHIPPED | OUTPATIENT
Start: 2024-04-24 | End: 2024-07-23

## 2024-04-24 RX ORDER — FENTANYL CITRATE 50 UG/ML
25 INJECTION, SOLUTION INTRAMUSCULAR; INTRAVENOUS EVERY 5 MIN PRN
Status: DISCONTINUED | OUTPATIENT
Start: 2024-04-24 | End: 2024-04-24 | Stop reason: HOSPADM

## 2024-04-24 RX ORDER — ONDANSETRON 2 MG/ML
4 INJECTION INTRAMUSCULAR; INTRAVENOUS EVERY 30 MIN PRN
Status: DISCONTINUED | OUTPATIENT
Start: 2024-04-24 | End: 2024-04-24 | Stop reason: HOSPADM

## 2024-04-24 RX ORDER — ONDANSETRON 4 MG/1
4 TABLET, ORALLY DISINTEGRATING ORAL EVERY 30 MIN PRN
Status: DISCONTINUED | OUTPATIENT
Start: 2024-04-24 | End: 2024-04-24 | Stop reason: HOSPADM

## 2024-04-24 RX ORDER — PROPOFOL 10 MG/ML
INJECTION, EMULSION INTRAVENOUS PRN
Status: DISCONTINUED | OUTPATIENT
Start: 2024-04-24 | End: 2024-04-24

## 2024-04-24 RX ORDER — CEFAZOLIN SODIUM/WATER 2 G/20 ML
2 SYRINGE (ML) INTRAVENOUS SEE ADMIN INSTRUCTIONS
Status: DISCONTINUED | OUTPATIENT
Start: 2024-04-24 | End: 2024-04-24 | Stop reason: HOSPADM

## 2024-04-24 RX ORDER — MAGNESIUM HYDROXIDE 1200 MG/15ML
LIQUID ORAL PRN
Status: DISCONTINUED | OUTPATIENT
Start: 2024-04-24 | End: 2024-04-24 | Stop reason: HOSPADM

## 2024-04-24 RX ORDER — IBUPROFEN 600 MG/1
TABLET, FILM COATED ORAL
Qty: 28 TABLET | Refills: 0 | Status: SHIPPED | OUTPATIENT
Start: 2024-04-24 | End: 2024-06-04

## 2024-04-24 RX ORDER — FENTANYL CITRATE 50 UG/ML
INJECTION, SOLUTION INTRAMUSCULAR; INTRAVENOUS PRN
Status: DISCONTINUED | OUTPATIENT
Start: 2024-04-24 | End: 2024-04-24

## 2024-04-24 RX ORDER — HYDROCODONE BITARTRATE AND ACETAMINOPHEN 5; 325 MG/1; MG/1
1-2 TABLET ORAL EVERY 6 HOURS PRN
Qty: 15 TABLET | Refills: 0 | Status: SHIPPED | OUTPATIENT
Start: 2024-04-24 | End: 2024-05-15

## 2024-04-24 RX ORDER — SODIUM CHLORIDE, SODIUM LACTATE, POTASSIUM CHLORIDE, CALCIUM CHLORIDE 600; 310; 30; 20 MG/100ML; MG/100ML; MG/100ML; MG/100ML
INJECTION, SOLUTION INTRAVENOUS CONTINUOUS
Status: DISCONTINUED | OUTPATIENT
Start: 2024-04-24 | End: 2024-04-24 | Stop reason: HOSPADM

## 2024-04-24 RX ADMIN — ONDANSETRON 4 MG: 2 INJECTION INTRAMUSCULAR; INTRAVENOUS at 09:53

## 2024-04-24 RX ADMIN — Medication 2 G: at 09:49

## 2024-04-24 RX ADMIN — PROPOFOL 100 MCG/KG/MIN: 10 INJECTION, EMULSION INTRAVENOUS at 09:53

## 2024-04-24 RX ADMIN — PROPOFOL 50 MG: 10 INJECTION, EMULSION INTRAVENOUS at 09:53

## 2024-04-24 RX ADMIN — LIDOCAINE HYDROCHLORIDE 20 MG: 20 INJECTION, SOLUTION INFILTRATION; PERINEURAL at 09:53

## 2024-04-24 RX ADMIN — SODIUM CHLORIDE, POTASSIUM CHLORIDE, SODIUM LACTATE AND CALCIUM CHLORIDE: 600; 310; 30; 20 INJECTION, SOLUTION INTRAVENOUS at 08:30

## 2024-04-24 RX ADMIN — FENTANYL CITRATE 50 MCG: 50 INJECTION INTRAMUSCULAR; INTRAVENOUS at 09:53

## 2024-04-24 RX ADMIN — FENTANYL CITRATE 25 MCG: 50 INJECTION INTRAMUSCULAR; INTRAVENOUS at 10:09

## 2024-04-24 RX ADMIN — PHENYLEPHRINE HYDROCHLORIDE 100 MCG: 10 INJECTION INTRAVENOUS at 10:25

## 2024-04-24 RX ADMIN — PHENYLEPHRINE HYDROCHLORIDE 0.3 MCG/KG/MIN: 10 INJECTION INTRAVENOUS at 10:34

## 2024-04-24 ASSESSMENT — ACTIVITIES OF DAILY LIVING (ADL)
ADLS_ACUITY_SCORE: 35

## 2024-04-24 ASSESSMENT — LIFESTYLE VARIABLES: TOBACCO_USE: 1

## 2024-04-24 ASSESSMENT — ENCOUNTER SYMPTOMS: DYSRHYTHMIAS: 1

## 2024-04-24 NOTE — ANESTHESIA POSTPROCEDURE EVALUATION
Patient: Nadiya Drummond    Procedure: Procedure(s):  1.  HAMMERTOE REPAIR RIGHT 2ND TOE; 2.  BILATERAL BONE SPUR RESECTION       Anesthesia Type:  MAC    Note:     Postop Pain Control: Uneventful            Sign Out: Well controlled pain   PONV: No   Neuro/Psych: Uneventful            Sign Out: Acceptable/Baseline neuro status   Airway/Respiratory: Uneventful            Sign Out: Acceptable/Baseline resp. status   CV/Hemodynamics: Uneventful            Sign Out: Acceptable CV status; No obvious hypovolemia; No obvious fluid overload   Other NRE: NONE   DID A NON-ROUTINE EVENT OCCUR? No           Last vitals:  Vitals Value Taken Time   /53 04/24/24 1115   Temp 36.7  C (98.1  F) 04/24/24 1103   Pulse 54 04/24/24 1118   Resp 15 04/24/24 1118   SpO2 100 % 04/24/24 1118   Vitals shown include unfiled device data.    Electronically Signed By: Monalisa Nolasco MD  April 24, 2024  11:19 AM

## 2024-04-24 NOTE — OR NURSING
Patient heart rate irregular, put patient on monitor. EKG NSR with PAC, and PVC. Dr Nolasco aware, no new orders.

## 2024-04-24 NOTE — ANESTHESIA CARE TRANSFER NOTE
Patient: Nadiya Drummond    Procedure: Procedure(s):  1.  HAMMERTOE REPAIR RIGHT 2ND TOE; 2.  BILATERAL BONE SPUR RESECTION       Diagnosis: Hammertoe of right foot [M20.41]  Diagnosis Additional Information: No value filed.    Anesthesia Type:   MAC     Note:    Oropharynx: oropharynx clear of all foreign objects  Level of Consciousness: awake  Oxygen Supplementation: face mask  Level of Supplemental Oxygen (L/min / FiO2): 6  Independent Airway: airway patency satisfactory and stable  Dentition: dentition unchanged  Vital Signs Stable: post-procedure vital signs reviewed and stable  Report to RN Given: handoff report given  Patient transferred to: PACU  Comments: To PACU: Awake/VSS  Report to RN  Handoff Report: Identifed the Patient, Identified the Reponsible Provider, Reviewed the pertinent medical history, Discussed the surgical course, Reviewed Intra-OP anesthesia mangement and issues during anesthesia, Set expectations for post-procedure period and Allowed opportunity for questions and acknowledgement of understanding      Vitals:  Vitals Value Taken Time   /45 04/24/24 1103   Temp     Pulse 54 04/24/24 1106   Resp 17 04/24/24 1106   SpO2 100 % 04/24/24 1106   Vitals shown include unfiled device data.    Electronically Signed By: DARWIN Navarro CRNA  April 24, 2024  11:08 AM  
No

## 2024-04-24 NOTE — DISCHARGE INSTRUCTIONS
Same Day Surgery Discharge Instructions for  Sedation and General Anesthesia     It's not unusual to feel dizzy, light-headed or faint for up to 24 hours after surgery or while taking pain medication.  If you have these symptoms: sit for a few minutes before standing and have someone assist you when you get up to walk or use the bathroom.    You should rest and relax for the next 24 hours. We recommend you make arrangements to have an adult stay with you for at least 24 hours after your discharge.  Avoid hazardous and strenuous activity.    DO NOT DRIVE any vehicle or operate mechanical equipment for 24 hours following the end of your surgery.  Even though you may feel normal, your reactions may be affected by the medication you have received.    Do not drink alcoholic beverages for 24 hours following surgery.     Slowly progress to your regular diet as you feel able. It's not unusual to feel nauseated and/or vomit after receiving anesthesia.  If you develop these symptoms, drink clear liquids (apple juice, ginger ale, broth, 7-up, etc. ) until you feel better.  If your nausea and vomiting persists for 24 hours, please notify your surgeon.      All narcotic pain medications, along with inactivity and anesthesia, can cause constipation. Drinking plenty of liquids and increasing fiber intake will help.    For any questions of a medical nature, call your surgeon.    Do not make important decisions for 24 hours.    If you had general anesthesia, you may have a sore throat for a couple of days related to the breathing tube used during surgery.  You may use Cepacol lozenges to help with this discomfort.  If it worsens or if you develop a fever, contact your surgeon.     If you feel your pain is not well managed with the pain medications prescribed by your surgeon, please contact your surgeon's office to let them know so they can address your concerns.     Crutch Instructions      Because of your surgery you will need  "crutches.  Make sure you tell your healthcare provider if crutches do not seem to work for you.  Using Crutches   Crutches are the most commonly used device for injuries to the lower part of the body because they allow the most mobility. All walking assistance devices require strength in your upper body but crutches require more coordination. If you are unable to use crutches then a cane or walker may be better.   Remember these rules when using crutches:   Always look straight ahead when you walk. Do not look down.   Hold the top padded part of the crutches into your chest near your armpits. Grasp the padded hand  and always support your weight with your arms and hands.   Do not lean on the crutches with your armpit as this could cause nerve damage.  Standing Up  Make sure you are in a stable chair. Move forward to the edge of the chair so that your  good  foot is flat on the floor. Put both crutches together and hold the handgrips in one hand. Stretch the injured leg out straight and then use the crutches with one hand and the chair armrest with the other hand to push yourself into a standing position onto your  good  leg. Once you are standing, wait until you are steady before placing a crutch in each hand. Until you become good at standing, have someone assist you in case you lose your balance. When standing still, lean slightly forward with the crutches ahead of you and about 3 feet apart. Unless you are told otherwise, you should keep weight off your injured leg.  Walking  To begin crutch walking, balance yourself on your  good  leg. Move both crutches forward about the length of one step and place them firmly on the floor in front of you about 3 feet apart. Support your weight on the padded hand rests while leaning forward. Press the top of the crutches against your chest wall and not into your armpits. Be sure to hold the injured leg up off of the floor. When ready, swing the \"good\" leg forward about " "one step length past the crutches while supporting your weight on the padded hand . Be careful not to go too far. Transfer your weight onto the \"good\" leg then bring both crutches forward about the length of one step. Repeating this motion will allow you to move fairly quickly without the use of your injured leg. Keep practicing until you become good at crutch walking.  Sitting Down  Make sure the chair you are about to sit on is stable. Walk in front of the chair such that you are facing away from it. Move back a little at a time until the back of your  good  leg is nearly touching the seat. Keeping your weight on the  good  leg, move both crutches to one hand holding onto the handgrips. Lean forward, bend your  good  knee, and move your injured leg out forward. Sit down slowly while using your free hand to reach for the chair s armrest for support. Place the crutches where you can easily get them. Never pivot, even on your  good  leg. This could cause you to fall or injure your  good  leg.  Navigating Stairs, Curbs & Door Steps  Only attempt this once you are very comfortable with regular crutch walking and only when someone is there to steady you should you begin to lose your balance. Hold on to a  railing with one hand and both crutches in the other hand or have someone carry the loose crutch for you. It does not matter which side the handrail is on. If there is no handrail, you can use both crutches. Using only crutches is the same as the railing method but maintaining your balance can be difficult. The crutch-only method is not recommended until you have become very good at crutch walking. Be sure to only take one step at a time. A simple rule to remember for crutches when navigating stairs or curbs: up with the  good  leg and down with the  bad .  Going Up Stairs or Curbs  Walk close to the first step with the crutches slightly behind you. Push down on the handrail and the crutch and step up " "with the \"good\" leg. You may have to make a short hop to do this if you are not allowed to place any weight on the injured leg. Lean forward and bring the injured leg and the crutch up beside the \"good\" leg. Repeat this until you have climbed up all of the steps. Remember, the \"good\" leg goes up first and the crutches move with the injured leg. The person helping you should stand behind and to your side that is away from the railing.  Going Down Stairs or Curbs  Walk to the edge of the first step. While standing and balancing on the  good  leg, place both crutches in one hand and then down on the step below. Be sure to support your weight by leaning on the crutches and handrail. Bring the injured leg forward, then the \"good\" leg down to the same step as the crutches. Remember crutches go down first with the injured leg. The person helping you should  front and to your side that is away from the railing.  Sitting Method for Navigating Stairs  A safer way to get up and down stairs is to sit down. To go up steps, sit down on the second or third step. Push with your  good  leg below while pushing up with both arms on the step above to move your bottom to the next step. When you get to the top of the stairs you may need to use the  scooting  method described later to get back up. To go down steps you first need to lower yourself to the floor near the top of the steps. Once seated, support yourself with your  good  leg on the second to next step below, and push with both arms on the step where you are sitting to move your bottom down to the next step. When you reach the bottom, pull yourself up to standing position using your crutches. It is helpful if someone can move your crutches and assist you in getting up and down. With practice it may be possible to manage on your own.  If You Start To Fall  If you start to fall and cannot get your balance, throw the crutches away from you. Try to fall onto your  good  side " "away from your injury and then break your fall with your arms. If uninjured, you can usually get back up by moving into a sitting position and scooting to a chair. Push with your arms and hands on the chair seat while pushing with the \"good\" leg to get up into the chair. You should not attempt to get back up if you are having significant pain. Call for assistance or dial 911 for an ambulance if necessary.  Safety Tips for Crutch Walking   At first you may want to wear a training belt (or a strong regular belt) so others can assist you.   Do not use crutches if you feel dizzy or drowsy.   Be careful on slick or wet surfaces like a kitchen or bathroom floor, snow, ice, or rainy conditions.   Temporarily remove pets, throw rugs or other items from your home that might trip you.   Do not hop around while holding onto furniture.   Wear sneakers or rubber soled shoes that will not easily slip or come off.   Be careful on ramps or slopes as they can be harder to walk up or down   Do not remove any parts from your crutches especially the padding or rubber traction footings. Replace padding or footings that become damaged immediately.   It is important to use your crutches correctly. If you feel any numbness or tingling in your arms, you are probably using the crutches incorrectly.  Helpful Hints   A bedside toilet or toilet riser may be helpful.   Always allow for extra time to get around. Children in school should be given permission to leave class early to avoid crowds when changing classes.   Elevate the injured leg when sitting.   Use a backpack to carry books or waist pouch to carry other items so that both hands are free.   Call your healthcare provider if you have any questions or concerns.      **If you have questions or concerns about your procedure,   call Dr. Sanchez at [phone]**  "

## 2024-04-24 NOTE — ANESTHESIA PREPROCEDURE EVALUATION
Anesthesia Pre-Procedure Evaluation    Patient: Nadiya Drummond   MRN: [id number] : 1940        Procedure : Procedure(s):  1.  HAMMERTOE REPAIR RIGHT 2ND TOE; 2.  BILATERAL BONE SPUR RESECTION          Past Medical History:   Diagnosis Date    Lactose intolerance     TMJ (temporomandibular joint disorder)     Tobacco abuse       Past Surgical History:   Procedure Laterality Date    BLEPHAROPLASTY Bilateral 2002    BUNIONECTOMY Left 2007    CATARACT IOL, RT/LT Bilateral 2007    ORTHOPEDIC SURGERY        Allergies   Allergen Reactions    Alendronate Muscle Pain (Myalgia)      Social History     Tobacco Use    Smoking status: Former     Current packs/day: 0.00     Types: Cigarettes     Start date: 1965     Quit date: 2022     Years since quittin.9     Passive exposure: Past    Smokeless tobacco: Never    Tobacco comments:     no comment   Substance Use Topics    Alcohol use: No      Wt Readings from Last 1 Encounters:   24 62.6 kg (138 lb 1.6 oz)        Anesthesia Evaluation   Pt has had prior anesthetic.     No history of anesthetic complications       ROS/MED HX  ENT/Pulmonary:     (+)                tobacco use,                     (-) sleep apnea   Neurologic:  - neg neurologic ROS     Cardiovascular:  - neg cardiovascular ROS   (+)  - -   -  - -                        dysrhythmias,           (-) hypertension   METS/Exercise Tolerance:     Hematologic:       Musculoskeletal: Comment: osteopenia      GI/Hepatic:    (-) GERD   Renal/Genitourinary:  - neg Renal ROS     Endo:  - neg endo ROS  (-) Type II DM   Psychiatric/Substance Use:       Infectious Disease:       Malignancy:       Other:            Physical Exam    Airway        Mallampati: II   TM distance: > 3 FB   Neck ROM: full   Mouth opening: > 3 cm    Respiratory Devices and Support         Dental       (+) Removable bridges or other hardware      Cardiovascular          Rhythm and rate: irregular     Pulmonary    "pulmonary exam normal                OUTSIDE LABS:  CBC:   Lab Results   Component Value Date    WBC 5.1 10/06/2023    WBC 5.8 04/13/2023    HGB 12.6 10/06/2023    HGB 12.0 04/13/2023    HCT 37.4 10/06/2023    HCT 36.1 04/13/2023     10/06/2023     04/13/2023     BMP:   Lab Results   Component Value Date     04/13/2023     07/16/2021    POTASSIUM 4.1 04/13/2023    POTASSIUM 3.8 07/16/2021    CHLORIDE 106 04/13/2023    CHLORIDE 110 (H) 07/16/2021    CO2 21 (L) 04/13/2023    CO2 26 07/16/2021    BUN 13.4 04/13/2023    BUN 10 07/16/2021    CR 0.71 04/13/2023    CR 0.76 07/16/2021    GLC 86 04/13/2023    GLC 94 07/16/2021     COAGS: No results found for: \"PTT\", \"INR\", \"FIBR\"  POC: No results found for: \"BGM\", \"HCG\", \"HCGS\"  HEPATIC:   Lab Results   Component Value Date    ALBUMIN 4.3 04/13/2023    PROTTOTAL 6.6 04/13/2023    ALT 18 04/13/2023    AST 32 04/13/2023    ALKPHOS 67 04/13/2023    BILITOTAL 0.6 04/13/2023     OTHER:   Lab Results   Component Value Date    RAZA 9.3 04/13/2023    TSH 2.16 04/13/2023    SED 7 02/19/2009       Anesthesia Plan    ASA Status:  2    NPO Status:  NPO Appropriate    Anesthesia Type: MAC.     - Reason for MAC: immobility needed, straight local not clinically adequate              Consents    Anesthesia Plan(s) and associated risks, benefits, and realistic alternatives discussed. Questions answered and patient/representative(s) expressed understanding.     - Discussed:     - Discussed with:  Patient            Postoperative Care    Pain management: IV analgesics.   PONV prophylaxis: Ondansetron (or other 5HT-3), Background Propofol Infusion     Comments:               Monalisa Nolasco MD    I have reviewed the pertinent notes and labs in the chart from the past 30 days and (re)examined the patient.  Any updates or changes from those notes are reflected in this note.                  "

## 2024-04-24 NOTE — OP NOTE
Foot & Ankle Surgery  April 24, 2024    Surgeon:   Justin Sanchez DPM FACFAS FACFAOM    Assistant: Mohan Austin DPM PGY-2    Pre-op diagnosis:   Exostosis left 1st tarsometatarsal joint  Exostosis right 1st tarsometatarsal joint  Painful right 2nd hammertoe    Post-op diagnosis:  same    Procedure:   Right 2nd PIPJ arthrodesis  Right 1st tarsometatarsal exostectomy  Left 1st tarsometatarsal exostectomy    Pathology:   none    Anesthesia: MAC    Hemostasis: A well-padded pneumatic ankle tourniquet was applied bilateral.  The left was inflated throughout the duration of the procedure.  The right tourniquet was not inflated    EBL: Less than 5 cc    Materials:    Arthrex Dynanite hammertoe implant 12 mm    Injectables: 30 cc of 0.25% bupivacaine plain    Complications: None apparent    Intra-operative findings: Prominent bone spurs first tarsometatarsal joint bilateral.  Good right second PIPJ implant placement with rectus alignment of the toe    Indications for procedure: Painful bone spurs bilateral first tarsometatarsal joint; painful right second hammertoe    After obtaining verbal consent, the patient was transferred to the operating room placed in the supine position on the operating table.  IV sedation was initiated.  The feet were anesthetized with a preoperative local.  There were then prepped and draped in normal aseptic fashion.  The tourniquet for the left lower extremity was inflated throughout the duration of the procedure.  The right lower extremity tourniquet was not inflated..    Procedure 1: Right second PIPJ arthrodesis -attention was directed at the right second toe where a 3 cm linear incision was carried down over the PIPJ.  Bleeding vessels were electrocauterized as necessary.  A transverse extensor tenotomy/capsulotomy was performed at the PIPJ and the soft tissue was reflected off of the head of the proximal phalanx and base of the middle phalanx.  The joint surfaces were resected  with a sagittal saw, creating flat/flush opposing bone surfaces.  Using proprietary technique, the Arthrex Dynanite implant was placed with threads in the middle phalanx and tines in the proximal phalanx.  The fusion site was tamped into position.  Clinically the toe is rectus and in good alignment.  Intraoperative imaging showed good alignment of the toe and good placement of the implant.    Procedure 2: Right first tarsometatarsal joint exostectomy -attention was directed to the bone spur at the dorsal right midfoot where a 3 cm linear incision was carried down subcutaneous tissue.  Bleeding vessels were electrocauterized as necessary.  Blunt dissection was used to carry the incision down to the deep fascia.  The extensor tendons were identified and retracted laterally and incision was then carried down to bone where subperiosteal dissection was performed.  Using a rongeur and a sagittal saw, the exostosis was resected.    Procedure 3: Left first tarsometatarsal joint exostectomy -the exact same procedure was performed to the left first tarsometatarsal joint exostosis.      The wound was flushed with copious amounts normal saline.  Layered closure was performed with 4-0 Vicryl, 4-0 nylon.  A dry sterile dressing was applied and the patient was transferred to the PACU with vital signs stable and vascular status intact.  They appeared to tolerate the procedure well without complications.    Justin Sanchez DPM FACFAS FACFAOM  Podiatric Foot & Ankle Surgeon  [facility]  [phone]

## 2024-04-29 LAB
ATRIAL RATE - MUSE: 64 BPM
DIASTOLIC BLOOD PRESSURE - MUSE: NORMAL MMHG
INTERPRETATION ECG - MUSE: NORMAL
P AXIS - MUSE: 69 DEGREES
PR INTERVAL - MUSE: 130 MS
QRS DURATION - MUSE: 84 MS
QT - MUSE: 448 MS
QTC - MUSE: 462 MS
R AXIS - MUSE: 59 DEGREES
SYSTOLIC BLOOD PRESSURE - MUSE: NORMAL MMHG
T AXIS - MUSE: 44 DEGREES
VENTRICULAR RATE- MUSE: 64 BPM

## 2024-05-02 ENCOUNTER — OFFICE VISIT (OUTPATIENT)
Dept: PODIATRY | Facility: CLINIC | Age: 84
End: 2024-05-02
Payer: COMMERCIAL

## 2024-05-02 VITALS
BODY MASS INDEX: 23.56 KG/M2 | WEIGHT: 138 LBS | DIASTOLIC BLOOD PRESSURE: 64 MMHG | SYSTOLIC BLOOD PRESSURE: 138 MMHG | HEIGHT: 64 IN

## 2024-05-02 DIAGNOSIS — Z98.890 S/P BILATERAL FOOT SURGERY: Primary | ICD-10-CM

## 2024-05-02 PROCEDURE — 99024 POSTOP FOLLOW-UP VISIT: CPT | Performed by: PODIATRIST

## 2024-05-02 NOTE — LETTER
"    5/2/2024         RE: Nadiya Drummond  [address] [facility] [address]        Dear Colleague,    Thank you for referring your patient, Nadiya Drummond, to the [facility]. Please see a copy of my visit note below.    Foot & Ankle Surgery  May 2, 2024    S:  Patient in today sp right second hammertoe repair with bilateral midfoot exostectomy on 4/24/2024.  Pain levels improving.  She has a sandal on the left foot, surgical shoe on the right foot.  She states the surgical shoe on the right foot does not fit properly    /64   Ht 1.626 m (5' 4\")   Wt 62.6 kg (138 lb)   BMI 23.69 kg/m        ROS - positive for CC.  Patient denies current nausea, vomiting, chills, fevers, belly pain, calf pain, chest pain or SOB.  Complete remainder of ROS is otherwise neg.    PE -sutures intact, skin margins coapted.  Minimal inflammation about the right foot incisions, no cellulitis bilateral..  Skin shows no trophic, color or temperature changes otherwise.  No calf redness, swelling or pain noted otherwise.    Imaging - IMPRESSION: Postoperative changes of the second toe and fifth  metatarsal. Changes appear chronic. Degenerative change first MTP  joint. Slight irregularity along the medial margin of the first  metatarsal head. This could represent a gouty erosion but no  associated soft tissue swelling is identified. No evidence for acute  fracture.    A/P - 83 year old yo patient approx 1 week sp above procedure  -We reviewed the procedure and intraoperative findings  -We reviewed the postop x-rays of the second toe  -Bandages were reapplied, specifically with the second toe splinted in a mildly plantarflexed position  -Continue all postoperative instructions without change; reviewed  -No pain medication refill request  -1 week follow-up for dressing change, wound check and possible suture removal    Follow up  -1 week or sooner with acute issues    Plan for [id number] -retired      Justin Sanchez DPM FACFAS FACFAOM  Podiatric Foot & Ankle Surgeon  [facility]  [phone]    Disclaimer: This note consists of symbols derived from keyboarding, dictation and/or voice recognition software. As a result, there may be errors in the script that have gone undetected. Please consider this when interpreting information found in this chart.      Again, thank you for allowing me to participate in the care of your patient.        Sincerely,        Justin Sanchze DPM, MARIE

## 2024-05-02 NOTE — PROGRESS NOTES
"Foot & Ankle Surgery  May 2, 2024    S:  Patient in today sp right second hammertoe repair with bilateral midfoot exostectomy on 4/24/2024.  Pain levels improving.  She has a sandal on the left foot, surgical shoe on the right foot.  She states the surgical shoe on the right foot does not fit properly    /64   Ht 1.626 m (5' 4\")   Wt 62.6 kg (138 lb)   BMI 23.69 kg/m        ROS - positive for CC.  Patient denies current nausea, vomiting, chills, fevers, belly pain, calf pain, chest pain or SOB.  Complete remainder of ROS is otherwise neg.    PE -sutures intact, skin margins coapted.  Minimal inflammation about the right foot incisions, no cellulitis bilateral..  Skin shows no trophic, color or temperature changes otherwise.  No calf redness, swelling or pain noted otherwise.    Imaging - IMPRESSION: Postoperative changes of the second toe and fifth  metatarsal. Changes appear chronic. Degenerative change first MTP  joint. Slight irregularity along the medial margin of the first  metatarsal head. This could represent a gouty erosion but no  associated soft tissue swelling is identified. No evidence for acute  fracture.    A/P - 83 year old yo patient approx 1 week sp above procedure  -We reviewed the procedure and intraoperative findings  -We reviewed the postop x-rays of the second toe  -Bandages were reapplied, specifically with the second toe splinted in a mildly plantarflexed position  -Continue all postoperative instructions without change; reviewed  -No pain medication refill request  -1 week follow-up for dressing change, wound check and possible suture removal    Follow up  -1 week or sooner with acute issues    Plan for [id number] -retired      Justin Sanchez DPM FACFAS FACFAOM  Podiatric Foot & Ankle Surgeon  [facility]  [phone]    Disclaimer: This note consists of symbols derived from keyboarding, dictation and/or voice recognition software. As a result, there may be " errors in the script that have gone undetected. Please consider this when interpreting information found in this chart.

## 2024-05-09 ENCOUNTER — OFFICE VISIT (OUTPATIENT)
Dept: PODIATRY | Facility: CLINIC | Age: 84
End: 2024-05-09
Payer: COMMERCIAL

## 2024-05-09 VITALS — DIASTOLIC BLOOD PRESSURE: 63 MMHG | SYSTOLIC BLOOD PRESSURE: 118 MMHG | WEIGHT: 138 LBS | BODY MASS INDEX: 23.69 KG/M2

## 2024-05-09 DIAGNOSIS — M89.8X7 EXOSTOSIS OF BOTH FEET: ICD-10-CM

## 2024-05-09 DIAGNOSIS — M20.41 HAMMERTOE OF RIGHT FOOT: ICD-10-CM

## 2024-05-09 DIAGNOSIS — Z98.890 S/P BILATERAL FOOT SURGERY: Primary | ICD-10-CM

## 2024-05-09 PROCEDURE — 99024 POSTOP FOLLOW-UP VISIT: CPT | Performed by: PODIATRIST

## 2024-05-09 NOTE — PATIENT INSTRUCTIONS
Thank you for choosing [facility] Podiatry / Foot & Ankle Surgery!    DR. WHIPPLE'S CLINIC LOCATIONS:     [facility] (Friday) TRIAGE LINE: [phone] [address]  APPOINTMENTS: [phone]   [place] [address] RADIOLOGY: [phone]    PHYSICAL THERAPY: [phone]    SET UP SURGERY: [phone]   [place] (Mon-Tues AM-Thurs) BILLING QUESTIONS: [phone]   [address] FAX: [phone]   [place] [address] [facility]: [phone]     You were seen today approximately 2 weeks out from bilateral foot surgery.  All sutures were removed.  Recommendations:    1.  For the left foot, you can gradually return to regular shoes and activities as symptoms allow.  Rest, ice, elevate and utilize Tylenol as needed.  You are okay to wash the foot without covering it but avoid soaking/submerging x 1 week.    2.  For the right foot, continue heel weightbearing in the surgical shoe.  Rest, ice, elevate and utilize Tylenol as needed.  You are okay to wash the foot without covering it but avoid soaking/submerging x 1 week.    Follow-up in 4 weeks for your next reassessment.  Arrive 20 minutes early as we will get updated x-rays of the right foot.  Schedule appointment for anytime after 8:00, as x-ray is not available first thing in the morning.  Call prior with any questions.

## 2024-05-09 NOTE — LETTER
"    5/9/2024         RE: Nadiya Drummond  [address] [facility] [address]        Dear Colleague,    Thank you for referring your patient, Nadiya Drummond, to the [facility]. Please see a copy of my visit note below.    Foot & Ankle Surgery  May 9, 2024    S:  Patient in today sp right second hammertoe PIPJ arthrodesis with bilateral midfoot exostectomy on 4/24/2024.  Pain levels \"hardly any\".  Following postop instructions    /63   Wt 62.6 kg (138 lb)   BMI 23.69 kg/m        ROS - positive for CC.  Patient denies current nausea, vomiting, chills, fevers, belly pain, calf pain, chest pain or SOB.  Complete remainder of ROS is otherwise neg.    PE -all sutures were removed, including right second hammertoe and bilateral midfoot exostectomy sites.  Minimal inflammation, no signs of infection.  On nonweightbearing examination, there is some dorsal contracture of the second toe but loading of the foot shows appropriate sagittal plane alignment.  Skin shows no trophic, color or temperature changes otherwise.  No calf redness, swelling or pain noted otherwise.    A/P - 83 year old yo patient approx 2 weeks sp above procedure  -All sutures were removed, no Steri-Strips were needed  -Return to shoes and activities as tolerated for the left foot; continue heel weightbearing in the surgical shoe for the right foot  -Okay to wash the foot tomorrow but avoid soaking/submerging x 1 week    Follow up  -4 weeks for updated x-rays of the right foot or sooner with acute issues    Plan for [id number] -retired      Justin Sanchez DPM FACFAS FACFAOM  Podiatric Foot & Ankle Surgeon  [facility]  [phone]    Disclaimer: This note consists of symbols derived from keyboarding, dictation and/or voice recognition software. As a result, there may be errors in the script that have gone undetected. Please consider this when interpreting information found in this " chart.      Again, thank you for allowing me to participate in the care of your patient.        Sincerely,        Justin Sanchez DPM, MARIE

## 2024-05-09 NOTE — PROGRESS NOTES
"Foot & Ankle Surgery  May 9, 2024    S:  Patient in today sp right second hammertoe PIPJ arthrodesis with bilateral midfoot exostectomy on 4/24/2024.  Pain levels \"hardly any\".  Following postop instructions    /63   Wt 62.6 kg (138 lb)   BMI 23.69 kg/m        ROS - positive for CC.  Patient denies current nausea, vomiting, chills, fevers, belly pain, calf pain, chest pain or SOB.  Complete remainder of ROS is otherwise neg.    PE -all sutures were removed, including right second hammertoe and bilateral midfoot exostectomy sites.  Minimal inflammation, no signs of infection.  On nonweightbearing examination, there is some dorsal contracture of the second toe but loading of the foot shows appropriate sagittal plane alignment.  Skin shows no trophic, color or temperature changes otherwise.  No calf redness, swelling or pain noted otherwise.    A/P - 83 year old yo patient approx 2 weeks sp above procedure  -All sutures were removed, no Steri-Strips were needed  -Return to shoes and activities as tolerated for the left foot; continue heel weightbearing in the surgical shoe for the right foot  -Okay to wash the foot tomorrow but avoid soaking/submerging x 1 week  -As there is some mild dorsal deviation of the second toe (noted during nonweightbearing examination), I encouraged her to utilize a crest pad which was dispensed.  The toe itself is perfectly reducible but the crest pad will hopefully minimize strain on the PIPJ arthrodesis site as this continues to heal    Follow up  -4 weeks for updated x-rays of the right foot or sooner with acute issues    Plan for [id number] -retired      Justin Sanchez DPM FACFAS FACFAOM  Podiatric Foot & Ankle Surgeon  [facility]  [phone]    Disclaimer: This note consists of symbols derived from keyboarding, dictation and/or voice recognition software. As a result, there may be errors in the script that have gone undetected. Please consider this when " interpreting information found in this chart.

## 2024-05-13 NOTE — PROGRESS NOTES
CARDIOLOGY CONSULT    REASON FOR CONSULT: PVCs    PRIMARY CARE PHYSICIAN:  Carmen Beckett    HISTORY OF PRESENT ILLNESS:  83-year-old female seen for PVCs.    Patient is quite healthy.  She does some walking and other light activity with no chest pain, lightheadedness, or shortness of breath.  She has been told before she has some skipped heartbeats, but denies any history of palpitations.  She has no lightheadedness or dizziness.  She recently had a preop EKG showing some PVCs.    PAST MEDICAL HISTORY:  Past Medical History:   Diagnosis Date    Lactose intolerance     TMJ (temporomandibular joint disorder)     Tobacco abuse        MEDICATIONS:  Current Outpatient Medications   Medication Sig Dispense Refill    CALCIUM-VITAMIN D PO       HYDROcodone-acetaminophen (NORCO) 5-325 MG tablet Take 1-2 tablets by mouth every 6 hours as needed for moderate to severe pain 15 tablet 0    hydrOXYzine HCl (ATARAX) 10 MG tablet Take 1-2 tablets every 4-6 hours as needed for pain control. 15 tablet 0    ibuprofen (ADVIL/MOTRIN) 600 MG tablet Take 600mg of ibuprofen every 6 hours x 7 days to assist in pain control.  If you are not tolerating the medication, you are ok to stop. 28 tablet 0    Multiple Vitamins-Minerals (OCUVITE PO) Eye vitamins daily      vitamin D3 (CHOLECALCIFEROL) 50 mcg (2000 units) tablet Take 1 tablet (50 mcg) by mouth daily for 90 days 90 tablet 0     No current facility-administered medications for this visit.       ALLERGIES:  Allergies   Allergen Reactions    Alendronate Muscle Pain (Myalgia)       SOCIAL HISTORY:  I have reviewed this patient's social history and updated it with pertinent information if needed. Nadiya Drummond  reports that she quit smoking about 1 years ago. Her smoking use included cigarettes. She started smoking about 59 years ago. She has been exposed to tobacco smoke. She has never used smokeless tobacco. She reports that she does not drink alcohol and does not use  "drugs.    FAMILY HISTORY:  I have reviewed this patient's family history and updated it with pertinent information if needed.   Family History   Problem Relation Age of Onset    Heart Disease Mother     Heart Disease Father     LUNG DISEASE Sister         unclear, asthma like    Diabetes Type 2  Sister         resolved after colon cancer surgery    Alcohol/Drug Brother         lived in [facility]    Diabetes Type 1 Daughter     Allergies Daughter     Diabetes Type 1 Grandchild     Colon Cancer Sister        REVIEW OF SYSTEMS:  Constitutional:  No weight loss, fever, chills  HEENT:  Eyes:  No visual loss, blurred vision, double vision or yellow sclerae. No hearing loss, sneezing, congestion, runny nose or sore throat.  Skin:  No rash or itching.  Cardiovascular: per HPI  Respiratory: per HPI  GI:  No anorexia, nausea, vomiting or diarrhea. No abdominal pain or blood.  :  No dysurea, hematuria  Neurologic:  No headache, paralysis, ataxia, numbness or tingling in the extremities. No change in bowel or bladder control.  Musculoskeletal:  No muscle pain  Hematologic:  No bleeding or bruising.  Lymphatics:  No enlarged nodes. No history of splenectomy.  Endocrine:  No reports of sweating, cold or heat intolerance. No polyuria or polydipsia.  Allergies:  No history of asthma, hives, eczema or rhinitis.    PHYSICAL EXAM:  BP (!) 174/60 (BP Location: Right arm, Patient Position: Sitting, Cuff Size: Adult Regular)   Pulse 58   Ht 1.626 m (5' 4\")   Wt 62 kg (136 lb 9.6 oz)   SpO2 98%   BMI 23.45 kg/m    Constitutional: awake, alert, no distress  Eyes: PERRL, sclera nonicteric  ENT: trachea midline  Respiratory: Lungs clear  Cardiovascular: Regular rate, several ectopic beats over 10 seconds, no murmur  GI: nondistended, nontender, bowel sounds present  Lymph/Hematologic: no lymphadenopathy  Skin: dry, no rash  Musculoskeletal: good muscle tone, strength 5/5 in upper and lower extremities  Neurologic: no focal " deficits  Neuropsychiatric: appropriate affact    DATA:  Labs:     Recent Labs   Lab Test 04/13/23  1355 07/16/21  1122   CHOL 207* 209*   HDL 73 67   * 129*   TRIG 47 65     EKG April 24: Sinus rhythm, 2 PVCs, 1 PAC    ASSESSMENT:  83-year-old female seen for PVCs.  She has no symptoms, but does have PVCs on exam today and on her recent EKG.  She has no angina or heart failure symptoms.  She will wear a 24-hour Holter monitor and do an echo to assess ejection fraction and valves.  Basic labs will be checked.  Doubtful this is A-fib or any sustained arrhythmia.  She was very worried about having a stroke, we discussed how this is probably not A-fib and PVCs do not put someone at risk for stroke.    RECOMMENDATIONS:  1.  PVCs  -24-hour Holter monitor  -Echo  -TSH, BMP, patient requests checking A1c    Follow-up as needed.    Colton Marcano MD  Cardiology - [facility]  Pager:  [phone]  Text Page  May 15, 2024

## 2024-05-15 ENCOUNTER — HOSPITAL ENCOUNTER (OUTPATIENT)
Dept: CARDIOLOGY | Facility: CLINIC | Age: 84
Discharge: HOME | End: 2024-05-15
Attending: INTERNAL MEDICINE | Admitting: INTERNAL MEDICINE
Payer: COMMERCIAL

## 2024-05-15 ENCOUNTER — LAB (OUTPATIENT)
Dept: LAB | Facility: CLINIC | Age: 84
End: 2024-05-15
Payer: COMMERCIAL

## 2024-05-15 ENCOUNTER — OFFICE VISIT (OUTPATIENT)
Dept: CARDIOLOGY | Facility: CLINIC | Age: 84
End: 2024-05-15
Payer: COMMERCIAL

## 2024-05-15 VITALS
BODY MASS INDEX: 23.32 KG/M2 | HEART RATE: 58 BPM | OXYGEN SATURATION: 98 % | DIASTOLIC BLOOD PRESSURE: 60 MMHG | HEIGHT: 64 IN | SYSTOLIC BLOOD PRESSURE: 174 MMHG | WEIGHT: 136.6 LBS

## 2024-05-15 DIAGNOSIS — R73.9 BLOOD GLUCOSE ELEVATED: ICD-10-CM

## 2024-05-15 DIAGNOSIS — I49.3 PVC'S (PREMATURE VENTRICULAR CONTRACTIONS): Primary | ICD-10-CM

## 2024-05-15 DIAGNOSIS — I49.3 PVC'S (PREMATURE VENTRICULAR CONTRACTIONS): ICD-10-CM

## 2024-05-15 DIAGNOSIS — R00.2 PALPITATIONS: ICD-10-CM

## 2024-05-15 LAB
ANION GAP SERPL CALCULATED.3IONS-SCNC: 9 MMOL/L (ref 7–15)
BUN SERPL-MCNC: 8.4 MG/DL (ref 8–23)
CALCIUM SERPL-MCNC: 9.3 MG/DL (ref 8.8–10.2)
CHLORIDE SERPL-SCNC: 107 MMOL/L (ref 98–107)
CREAT SERPL-MCNC: 0.7 MG/DL (ref 0.51–0.95)
DEPRECATED HCO3 PLAS-SCNC: 25 MMOL/L (ref 22–29)
EGFRCR SERPLBLD CKD-EPI 2021: 86 ML/MIN/1.73M2
GLUCOSE SERPL-MCNC: 99 MG/DL (ref 70–99)
HBA1C MFR BLD: 5.7 %
POTASSIUM SERPL-SCNC: 4.4 MMOL/L (ref 3.4–5.3)
SODIUM SERPL-SCNC: 141 MMOL/L (ref 135–145)
TSH SERPL DL<=0.005 MIU/L-ACNC: 2.68 UIU/ML (ref 0.3–4.2)

## 2024-05-15 PROCEDURE — 93225 XTRNL ECG REC<48 HRS REC: CPT

## 2024-05-15 PROCEDURE — 93227 XTRNL ECG REC<48 HR R&I: CPT | Performed by: INTERNAL MEDICINE

## 2024-05-15 PROCEDURE — 84443 ASSAY THYROID STIM HORMONE: CPT | Performed by: INTERNAL MEDICINE

## 2024-05-15 PROCEDURE — 36415 COLL VENOUS BLD VENIPUNCTURE: CPT | Performed by: INTERNAL MEDICINE

## 2024-05-15 PROCEDURE — 80048 BASIC METABOLIC PNL TOTAL CA: CPT | Performed by: INTERNAL MEDICINE

## 2024-05-15 PROCEDURE — 83036 HEMOGLOBIN GLYCOSYLATED A1C: CPT | Performed by: INTERNAL MEDICINE

## 2024-05-15 PROCEDURE — 99204 OFFICE O/P NEW MOD 45 MIN: CPT | Mod: 24 | Performed by: INTERNAL MEDICINE

## 2024-05-15 NOTE — LETTER
5/15/2024    Carmen Beckett MD  [address]  [place] [address]    RE: Nadiya Drummond       Dear Colleague,     I had the pleasure of seeing Nadiya Drummond in the [facility].  CARDIOLOGY CONSULT    REASON FOR CONSULT: PVCs    PRIMARY CARE PHYSICIAN:  Carmen Beckett    HISTORY OF PRESENT ILLNESS:  83-year-old female seen for PVCs.    Patient is quite healthy.  She does some walking and other light activity with no chest pain, lightheadedness, or shortness of breath.  She has been told before she has some skipped heartbeats, but denies any history of palpitations.  She has no lightheadedness or dizziness.  She recently had a preop EKG showing some PVCs.    PAST MEDICAL HISTORY:  Past Medical History:   Diagnosis Date    Lactose intolerance     TMJ (temporomandibular joint disorder)     Tobacco abuse        MEDICATIONS:  Current Outpatient Medications   Medication Sig Dispense Refill    CALCIUM-VITAMIN D PO       HYDROcodone-acetaminophen (NORCO) 5-325 MG tablet Take 1-2 tablets by mouth every 6 hours as needed for moderate to severe pain 15 tablet 0    hydrOXYzine HCl (ATARAX) 10 MG tablet Take 1-2 tablets every 4-6 hours as needed for pain control. 15 tablet 0    ibuprofen (ADVIL/MOTRIN) 600 MG tablet Take 600mg of ibuprofen every 6 hours x 7 days to assist in pain control.  If you are not tolerating the medication, you are ok to stop. 28 tablet 0    Multiple Vitamins-Minerals (OCUVITE PO) Eye vitamins daily      vitamin D3 (CHOLECALCIFEROL) 50 mcg (2000 units) tablet Take 1 tablet (50 mcg) by mouth daily for 90 days 90 tablet 0     No current facility-administered medications for this visit.       ALLERGIES:  Allergies   Allergen Reactions    Alendronate Muscle Pain (Myalgia)       SOCIAL HISTORY:  I have reviewed this patient's social history and updated it with pertinent information if needed. Nadiya Drummond  reports that she quit smoking about 1 years ago. Her smoking  "use included cigarettes. She started smoking about 59 years ago. She has been exposed to tobacco smoke. She has never used smokeless tobacco. She reports that she does not drink alcohol and does not use drugs.    FAMILY HISTORY:  I have reviewed this patient's family history and updated it with pertinent information if needed.   Family History   Problem Relation Age of Onset    Heart Disease Mother     Heart Disease Father     LUNG DISEASE Sister         unclear, asthma like    Diabetes Type 2  Sister         resolved after colon cancer surgery    Alcohol/Drug Brother         lived in [facility]    Diabetes Type 1 Daughter     Allergies Daughter     Diabetes Type 1 Grandchild     Colon Cancer Sister        REVIEW OF SYSTEMS:  Constitutional:  No weight loss, fever, chills  HEENT:  Eyes:  No visual loss, blurred vision, double vision or yellow sclerae. No hearing loss, sneezing, congestion, runny nose or sore throat.  Skin:  No rash or itching.  Cardiovascular: per HPI  Respiratory: per HPI  GI:  No anorexia, nausea, vomiting or diarrhea. No abdominal pain or blood.  :  No dysurea, hematuria  Neurologic:  No headache, paralysis, ataxia, numbness or tingling in the extremities. No change in bowel or bladder control.  Musculoskeletal:  No muscle pain  Hematologic:  No bleeding or bruising.  Lymphatics:  No enlarged nodes. No history of splenectomy.  Endocrine:  No reports of sweating, cold or heat intolerance. No polyuria or polydipsia.  Allergies:  No history of asthma, hives, eczema or rhinitis.    PHYSICAL EXAM:  BP (!) 174/60 (BP Location: Right arm, Patient Position: Sitting, Cuff Size: Adult Regular)   Pulse 58   Ht 1.626 m (5' 4\")   Wt 62 kg (136 lb 9.6 oz)   SpO2 98%   BMI 23.45 kg/m    Constitutional: awake, alert, no distress  Eyes: PERRL, sclera nonicteric  ENT: trachea midline  Respiratory: Lungs clear  Cardiovascular: Regular rate, several ectopic beats over 10 seconds, no murmur  GI: nondistended, " nontender, bowel sounds present  Lymph/Hematologic: no lymphadenopathy  Skin: dry, no rash  Musculoskeletal: good muscle tone, strength 5/5 in upper and lower extremities  Neurologic: no focal deficits  Neuropsychiatric: appropriate affact    DATA:  Labs:     Recent Labs   Lab Test 04/13/23  1355 07/16/21  1122   CHOL 207* 209*   HDL 73 67   * 129*   TRIG 47 65     EKG April 24: Sinus rhythm, 2 PVCs, 1 PAC    ASSESSMENT:  83-year-old female seen for PVCs.  She has no symptoms, but does have PVCs on exam today and on her recent EKG.  She has no angina or heart failure symptoms.  She will wear a 24-hour Holter monitor and do an echo to assess ejection fraction and valves.  Basic labs will be checked.  Doubtful this is A-fib or any sustained arrhythmia.  She was very worried about having a stroke, we discussed how this is probably not A-fib and PVCs do not put someone at risk for stroke.    RECOMMENDATIONS:  1.  PVCs  -24-hour Holter monitor  -Echo  -TSH, BMP, patient requests checking A1c    Follow-up as needed.    Colton Marcano MD  Cardiology - [facility]  Pager:  [phone]  Text Page  May 15, 2024        Thank you for allowing me to participate in the care of your patient.      Sincerely,     Colton Marcano MD     [facility]  cc:   Referred Self,

## 2024-05-15 NOTE — PATIENT INSTRUCTIONS
You are having premature heart beats which are benign.    Will have you wear a 24 hour hear monitor to see how many skipped beats you are having.    Will check basic labs for thyroid, kidney function, and blood sugar.    Echocardiogram  Will schedule an echocardiogram, or ultrasound of the heart.  This looks at the size and function of the heart and valves.  It generally takes about 20-30 minutes.  This will tell if there is any reduced heart function or problem with any of the valves.

## 2024-05-15 NOTE — RESULT ENCOUNTER NOTE
Labs okay, A1c is in the very borderline range.     Nirmal       Updated patient with results and comments via [employer]

## 2024-05-20 NOTE — RESULT ENCOUNTER NOTE
PVC burden is moderate at 16%.  She is asymptomatic, no new recommendations.     Nirmal       Patient updated with results and comments via [employer].

## 2024-06-04 ENCOUNTER — OFFICE VISIT (OUTPATIENT)
Dept: PODIATRY | Facility: CLINIC | Age: 84
End: 2024-06-04
Payer: COMMERCIAL

## 2024-06-04 ENCOUNTER — ANCILLARY PROCEDURE (OUTPATIENT)
Dept: GENERAL RADIOLOGY | Facility: CLINIC | Age: 84
End: 2024-06-04
Attending: PODIATRIST
Payer: COMMERCIAL

## 2024-06-04 VITALS — SYSTOLIC BLOOD PRESSURE: 130 MMHG | BODY MASS INDEX: 23.52 KG/M2 | DIASTOLIC BLOOD PRESSURE: 63 MMHG | WEIGHT: 137 LBS

## 2024-06-04 DIAGNOSIS — M20.41 HAMMERTOE OF RIGHT FOOT: ICD-10-CM

## 2024-06-04 DIAGNOSIS — Z98.890 S/P BILATERAL FOOT SURGERY: ICD-10-CM

## 2024-06-04 DIAGNOSIS — M20.41 HAMMERTOE OF RIGHT FOOT: Primary | ICD-10-CM

## 2024-06-04 PROCEDURE — 99024 POSTOP FOLLOW-UP VISIT: CPT | Performed by: PODIATRIST

## 2024-06-04 PROCEDURE — 73660 X-RAY EXAM OF TOE(S): CPT | Mod: TC | Performed by: RADIOLOGY

## 2024-06-04 NOTE — PROGRESS NOTES
"Foot & Ankle Surgery  June 4, 2024    S:  Patient in today sp right second hammertoe repair with bilateral midfoot exostectomy on 4/24/2024.  Pain levels low.  She states the midfoot exostectomy sites can be sore with shoe gear pressure and with increased time on her feet.  She return to regular shoe on the left foot shortly after her last appointment 4 weeks ago.  She states she wore the right foot surgical shoe and the crest pad for a bit but is now back to regular shoes.  She states \"it doesn't hurt anymore\" with respect to the second hammertoe    There were no vitals taken for this visit.      ROS - positive for CC.  Patient denies current nausea, vomiting, chills, fevers, belly pain, calf pain, chest pain or SOB.  Complete remainder of ROS is otherwise neg.    PE -incisions are healed.  Mild swelling at the midfoot exostectomy sites, within normal limits.  The second toe is edematous, within normal limits for the stage postop.  The toe seems to be mildly dorsally contracted and overlapping the hallux.  Skin shows no trophic, color or temperature changes otherwise.  No calf redness, swelling or pain noted otherwise.    Imaging -2 views right second toe -intact implant.  Progressive healing is seen although full bone bridging is not yet accomplished.  Some flexion is seen on the lateral view at the PIPJ      A/P - 84 year old yo patient approx 6 weeks sp above procedure  -I personally interpreted and reviewed the x-rays.  The second toe is rectus in the transverse plane.  The hallux and third toes are under lapping the second toe.  This, and the swelling of the second toe, is likely the cause of mild dorsal deviation of the toe.  AP view shows well opposed bone with evidence of healing without hardware pathology.  On the lateral, there is mild residual flexion at the PIP joint but otherwise no acute changes are noted  -Clinically, the patient is doing quite well and her pain in the second hammertoe has resolved.  " We discussed that we typically have patients in the surgical shoe at this point, and I encouraged her to return to the shoe, but stated that if her current shoe gear is simply more comfortable, this is reasonable  -Rest, ice, elevate and utilize Tylenol as needed for pain.   -She will follow-up in 4 weeks for final films and clinical assessment    Follow up  -4 weeks or sooner with acute issues        Justin Sanchez DPM [facility] FACFAOM  Podiatric Foot & Ankle Surgeon  [facility]  [phone]    Disclaimer: This note consists of symbols derived from keyboarding, dictation and/or voice recognition software. As a result, there may be errors in the script that have gone undetected. Please consider this when interpreting information found in this chart.

## 2024-06-04 NOTE — LETTER
"    6/4/2024         RE: Nadiya Drummond  [address] [facility] [address]        Dear Colleague,    Thank you for referring your patient, Nadiya Drummond, to the [facility]. Please see a copy of my visit note below.    Foot & Ankle Surgery  June 4, 2024    S:  Patient in today sp right second hammertoe repair with bilateral midfoot exostectomy on 4/24/2024.  Pain levels low.  She states the midfoot exostectomy sites can be sore with shoe gear pressure and with increased time on her feet.  She return to regular shoe on the left foot shortly after her last appointment 4 weeks ago.  She states she wore the right foot surgical shoe and the crest pad for a bit but is now back to regular shoes.  She states \"it doesn't hurt anymore\" with respect to the second hammertoe    There were no vitals taken for this visit.      ROS - positive for CC.  Patient denies current nausea, vomiting, chills, fevers, belly pain, calf pain, chest pain or SOB.  Complete remainder of ROS is otherwise neg.    PE -incisions are healed.  Mild swelling at the midfoot exostectomy sites, within normal limits.  The second toe is edematous, within normal limits for the stage postop.  The toe seems to be mildly dorsally contracted and overlapping the hallux.  Skin shows no trophic, color or temperature changes otherwise.  No calf redness, swelling or pain noted otherwise.    Imaging -2 views right second toe -intact implant.  Progressive healing is seen although full bone bridging is not yet accomplished.  Some flexion is seen on the lateral view at the PIPJ      A/P - 84 year old yo patient approx 6 weeks sp above procedure  -I personally interpreted and reviewed the x-rays.  AP view shows well opposed bone with evidence of healing without hardware pathology.  On the lateral, there is mild residual flexion at the PIP joint but otherwise no acute changes are noted  -Clinically, the patient is doing quite well and " her pain in the second hammertoe has resolved.  We discussed that we typically have patients in the surgical shoe at this point, and I encouraged her to return to the shoe, but stated that if her current shoe gear is simply more comfortable, this is reasonable  -Rest, ice, elevate and utilize Tylenol as needed for pain.   -She will follow-up in 4 weeks for final films and clinical assessment    Follow up  -4 weeks or sooner with acute issues        Justin Sanchez DPM [facility] FACFAOM  Podiatric Foot & Ankle Surgeon  [facility]  [phone]    Disclaimer: This note consists of symbols derived from keyboarding, dictation and/or voice recognition software. As a result, there may be errors in the script that have gone undetected. Please consider this when interpreting information found in this chart.      Again, thank you for allowing me to participate in the care of your patient.        Sincerely,        Justin Sanchez DPM, MARIE

## 2024-06-20 ENCOUNTER — HOSPITAL ENCOUNTER (OUTPATIENT)
Dept: CARDIOLOGY | Facility: CLINIC | Age: 84
Discharge: HOME | End: 2024-06-20
Attending: INTERNAL MEDICINE | Admitting: INTERNAL MEDICINE
Payer: COMMERCIAL

## 2024-06-20 DIAGNOSIS — I49.3 PVC'S (PREMATURE VENTRICULAR CONTRACTIONS): ICD-10-CM

## 2024-06-20 LAB — LVEF ECHO: NORMAL

## 2024-06-20 PROCEDURE — 93306 TTE W/DOPPLER COMPLETE: CPT | Mod: 26 | Performed by: INTERNAL MEDICINE

## 2024-06-20 PROCEDURE — 93306 TTE W/DOPPLER COMPLETE: CPT

## 2024-06-20 NOTE — RESULT ENCOUNTER NOTE
Echo was normal, no new recommendations.     Nirmal       Patient updated with results and comments via [employer].

## 2024-07-02 ENCOUNTER — OFFICE VISIT (OUTPATIENT)
Dept: PODIATRY | Facility: CLINIC | Age: 84
End: 2024-07-02
Payer: COMMERCIAL

## 2024-07-02 ENCOUNTER — ANCILLARY PROCEDURE (OUTPATIENT)
Dept: GENERAL RADIOLOGY | Facility: CLINIC | Age: 84
End: 2024-07-02
Attending: PODIATRIST
Payer: COMMERCIAL

## 2024-07-02 VITALS — DIASTOLIC BLOOD PRESSURE: 60 MMHG | WEIGHT: 137 LBS | BODY MASS INDEX: 23.52 KG/M2 | SYSTOLIC BLOOD PRESSURE: 133 MMHG

## 2024-07-02 DIAGNOSIS — Z98.890 S/P BILATERAL FOOT SURGERY: ICD-10-CM

## 2024-07-02 DIAGNOSIS — M20.41 HAMMERTOE OF RIGHT FOOT: ICD-10-CM

## 2024-07-02 DIAGNOSIS — M20.41 HAMMERTOE OF RIGHT FOOT: Primary | ICD-10-CM

## 2024-07-02 PROCEDURE — 73660 X-RAY EXAM OF TOE(S): CPT | Mod: TC | Performed by: RADIOLOGY

## 2024-07-02 PROCEDURE — 99024 POSTOP FOLLOW-UP VISIT: CPT | Performed by: PODIATRIST

## 2024-07-02 NOTE — PROGRESS NOTES
"Foot & Ankle Surgery  July 2, 2024    S:  Patient in today sp   1.  Hammertoe Repair Right 2nd Toe; 2.  Bilateral Bone Spur Resection  on 4/24/24.  Pain levels \"none\".  She was last seen on 6/4/2024 where she was doing well with respect to the second hammertoe repair, \"it does not hurt anymore\" she has had at the time.  The exostectomy sites dorsal midfoot bilateral could be tender with sugar pressure and increased time on her feet.  She is back to regular shoes without complaint.  She states that her right third toe, prior to surgery, was under lapping the second toe and she feels like the third toe is now sitting straighter.  \"I'm happy\" with the outcome.    There were no vitals taken for this visit.      ROS - positive for CC.  Patient denies current nausea, vomiting, chills, fevers, belly pain, calf pain, chest pain or SOB.  Complete remainder of ROS is otherwise neg.    PE -incisions are well-healed.  Mild swelling at all surgical sites, within normal limits for the stage postop.  Again there appears to be some deviation in the position of the toe compared to intraoperative alignment, but there is no pain with palpation or with stressing of the PIPJ arthrodesis site.  Skin shows no trophic, color or temperature changes otherwise.  No calf redness, swelling or pain noted otherwise.    Imaging - 2 views WB - progressive healing seen, although full bone bridging is not yet accomplished.  No change in alignment.      A/P - 84 year old yo patient approx 10 weeks sp above procedure  -I personally interpreted and reviewed the x-rays.  Clinically and radiographically, there does appear to be some deviation in the position of the toe compared to intraoperative alignment.  However, she is having no pain and is quite happy with her outcome.  She is back to regular shoes and activities.  As such, no scheduled follow-up is needed but I encouraged her to call with any questions.    Follow up  -as needed or sooner with acute " issues    Plan for [id number] - retired       Justin Sanchez DPM [facility]  Podiatric Foot & Ankle Surgeon  [facility]  [phone]    Disclaimer: This note consists of symbols derived from keyboarding, dictation and/or voice recognition software. As a result, there may be errors in the script that have gone undetected. Please consider this when interpreting information found in this chart.

## 2024-07-02 NOTE — LETTER
"7/2/2024      Nadiya Drummond  [address] [facility] [address]      Dear Colleague,    Thank you for referring your patient, Nadiya Drummond, to the [facility]. Please see a copy of my visit note below.    Foot & Ankle Surgery  July 2, 2024    S:  Patient in today sp   1.  Hammertoe Repair Right 2nd Toe; 2.  Bilateral Bone Spur Resection  on 4/24/24.  Pain levels \"none\".  She was last seen on 6/4/2024 where she was doing well with respect to the second hammertoe repair, \"it does not hurt anymore\" she has had at the time.  The exostectomy sites dorsal midfoot bilateral could be tender with sugar pressure and increased time on her feet.  She is back to regular shoes without complaint.  She states that her right third toe, prior to surgery, was under lapping the second toe and she feels like the third toe is now sitting straighter.  \"I'm happy\" with the outcome.    There were no vitals taken for this visit.      ROS - positive for CC.  Patient denies current nausea, vomiting, chills, fevers, belly pain, calf pain, chest pain or SOB.  Complete remainder of ROS is otherwise neg.    PE -incisions are well-healed.  Mild swelling at all surgical sites, within normal limits for the stage postop.  Again there appears to be some deviation in the position of the toe compared to intraoperative alignment, but there is no pain with palpation or with stressing of the PIPJ arthrodesis site.  Skin shows no trophic, color or temperature changes otherwise.  No calf redness, swelling or pain noted otherwise.    Imaging - 2 views WB - progressive healing seen, although full bone bridging is not yet accomplished.  No change in alignment.      A/P - 84 year old yo patient approx 10 weeks sp above procedure  -I personally interpreted and reviewed the x-rays.  Clinically and radiographically, there does appear to be some deviation in the position of the toe compared to intraoperative alignment.  However, " she is having no pain and is quite happy with her outcome.  She is back to regular shoes and activities.  As such, no scheduled follow-up is needed but I encouraged her to call with any questions.    Follow up  -as needed or sooner with acute issues    Plan for [id number] - retired       Justin Sanchez DPM FACFAS FACFAOM  Podiatric Foot & Ankle Surgeon  [facility]  [phone]    Disclaimer: This note consists of symbols derived from keyboarding, dictation and/or voice recognition software. As a result, there may be errors in the script that have gone undetected. Please consider this when interpreting information found in this chart.      Again, thank you for allowing me to participate in the care of your patient.        Sincerely,        Justin Sanchez DPM, MARIE

## 2024-10-22 ENCOUNTER — OFFICE VISIT (OUTPATIENT)
Dept: SURGERY | Facility: CLINIC | Age: 84
End: 2024-10-22
Payer: COMMERCIAL

## 2024-10-22 ENCOUNTER — OFFICE VISIT (OUTPATIENT)
Dept: FAMILY MEDICINE | Facility: CLINIC | Age: 84
End: 2024-10-22
Payer: COMMERCIAL

## 2024-10-22 VITALS
HEART RATE: 58 BPM | WEIGHT: 134 LBS | DIASTOLIC BLOOD PRESSURE: 54 MMHG | RESPIRATION RATE: 16 BRPM | OXYGEN SATURATION: 96 % | HEIGHT: 64 IN | SYSTOLIC BLOOD PRESSURE: 133 MMHG | TEMPERATURE: 97.3 F | BODY MASS INDEX: 22.88 KG/M2

## 2024-10-22 VITALS
OXYGEN SATURATION: 94 % | SYSTOLIC BLOOD PRESSURE: 106 MMHG | HEIGHT: 64 IN | BODY MASS INDEX: 22.88 KG/M2 | DIASTOLIC BLOOD PRESSURE: 54 MMHG | HEART RATE: 69 BPM | WEIGHT: 134 LBS

## 2024-10-22 DIAGNOSIS — K40.90 NON-RECURRENT UNILATERAL INGUINAL HERNIA WITHOUT OBSTRUCTION OR GANGRENE: Primary | ICD-10-CM

## 2024-10-22 DIAGNOSIS — K40.90 LEFT INGUINAL HERNIA: Primary | ICD-10-CM

## 2024-10-22 DIAGNOSIS — K40.90 NON-RECURRENT UNILATERAL INGUINAL HERNIA WITHOUT OBSTRUCTION OR GANGRENE: ICD-10-CM

## 2024-10-22 PROCEDURE — G2211 COMPLEX E/M VISIT ADD ON: HCPCS

## 2024-10-22 PROCEDURE — 99204 OFFICE O/P NEW MOD 45 MIN: CPT | Performed by: SURGERY

## 2024-10-22 PROCEDURE — 99213 OFFICE O/P EST LOW 20 MIN: CPT

## 2024-10-22 NOTE — PROGRESS NOTES
HPI:  Nadiya is a 84 year old female who presents for evaluation of left groin bulging.  This is occasionally uncomfortable.  She initially noticed 1 bulge about a year ago, but has now noticed an adjacent bulge a bit lower down.  She does not believe these lumps never gets stuck out.  She has not felt anything similar on the opposite side.    Past Medical History:   has a past medical history of Lactose intolerance, TMJ (temporomandibular joint disorder), and Tobacco abuse.    Past Surgical History:  Past Surgical History:   Procedure Laterality Date    BLEPHAROPLASTY Bilateral 2002    BUNIONECTOMY Left 2007    CATARACT IOL, RT/LT Bilateral 2007    ORTHOPEDIC SURGERY      REPAIR HAMMER TOE Bilateral 2024    Procedure: 1.  HAMMERTOE REPAIR RIGHT 2ND TOE; 2.  BILATERAL BONE SPUR RESECTION;  Surgeon: Justin Sanchez DPM;  Location:  OR        Social History:  Social History     Socioeconomic History    Marital status: Single     Spouse name: Not on file    Number of children: 3    Years of education: Not on file    Highest education level: Not on file   Occupational History    Not on file   Tobacco Use    Smoking status: Former     Current packs/day: 0.00     Types: Cigarettes     Start date: 1965     Quit date: 2022     Years since quittin.4     Passive exposure: Past    Smokeless tobacco: Never    Tobacco comments:     no comment   Vaping Use    Vaping status: Never Used   Substance and Sexual Activity    Alcohol use: No    Drug use: No    Sexual activity: Not Currently     Partners: Male     Birth control/protection: None   Other Topics Concern     Service No    Blood Transfusions No    Caffeine Concern Not Asked    Occupational Exposure Not Asked    Hobby Hazards Not Asked    Sleep Concern Not Asked    Stress Concern Not Asked    Weight Concern Not Asked    Special Diet Not Asked    Back Care Not Asked    Exercise No    Bike Helmet Not Asked    Seat Belt Yes     Self-Exams No    Parent/sibling w/ CABG, MI or angioplasty before 65F 55M? No   Social History Narrative    07-    Balanced Diet - Yes    Osteoporosis Prevention Measures - Dairy servings per day: 1 and Medication/Supplements (See current meds)    Regular Exercise -  No Describe     Dental Exam - has false teeth,got them  about 9 months ago    Eye Exam - 2006    Self Breast Exam - No    Abuse: Current or Past (Physical, Sexual or Emotional)- No    Do you feel safe in your environment - Yes    Guns stored in the home - Yes    Sunscreen used - No    Seatbelts used - Yes    Lipids -      Glucose -      Colon Cancer Screening - Flexible Sigmoidoscopy about 5 years ago, at the [facility](date completed)    Hemoccults - NO    Pap Test -  About 3 years ago    Do you have any concerns about STD's -  No    Mammography - about 3 years ago    DEXA - about 3 years ago    Immunizations reviewed and up to date - pt. Thinks she had a TD, shot a year ago             Social Determinants of Health     Financial Resource Strain: Low Risk  (1/15/2024)    Financial Resource Strain     Within the past 12 months, have you or your family members you live with been unable to get utilities (heat, electricity) when it was really needed?: No   Food Insecurity: Low Risk  (1/15/2024)    Food Insecurity     Within the past 12 months, did you worry that your food would run out before you got money to buy more?: No     Within the past 12 months, did the food you bought just not last and you didn t have money to get more?: No   Transportation Needs: Low Risk  (1/15/2024)    Transportation Needs     Within the past 12 months, has lack of transportation kept you from medical appointments, getting your medicines, non-medical meetings or appointments, work, or from getting things that you need?: No   Physical Activity: Not on file   Stress: Not on file   Social Connections: Not on file   Interpersonal Safety: Low Risk   (10/22/2024)    Interpersonal Safety     Do you feel physically and emotionally safe where you currently live?: Yes     Within the past 12 months, have you been hit, slapped, kicked or otherwise physically hurt by someone?: No     Within the past 12 months, have you been humiliated or emotionally abused in other ways by your partner or ex-partner?: No   Housing Stability: Low Risk  (1/15/2024)    Housing Stability     Do you have housing? : Yes     Are you worried about losing your housing?: No        Family History:  Family History   Problem Relation Age of Onset    Heart Disease Mother     Heart Disease Father     LUNG DISEASE Sister         unclear, asthma like    Diabetes Type 2  Sister         resolved after colon cancer surgery    Alcohol/Drug Brother         lived in [facility]    Diabetes Type 1 Daughter     Allergies Daughter     Diabetes Type 1 Grandchild     Colon Cancer Sister          ROS:  The 10 point review of systems is negative other than noted in the HPI and above.    PE:    General- Well-developed, well-nourished, patient able to get up on table without difficulty.  HEENT- Normocephalic and atraumatic. Pupils equal and round.  Mucous membranes moist.  Sclera are nonicteric.  Neck- No lymphadenopathy or masses   Respirations- are regular and non labored  Abdomen is abdomen is soft without significant tenderness, masses, organomegaly or guarding  Hernia- Left inguinal hernia is present with valsalva.  There appeared to be 2 separate defects.  Each is reducible.              Right inguinal hernia is not obviously present with valsalva   Umbilical hernia is not present.              External genitalia are normal               Assessment: left inguinal hernia    Plan: I have recommended robotic assisted repair of the patient's left inguinal hernia with mesh.  We have discussed observation, reduction techniques and importance, incarceration and strangulation signs, symptoms and importance as well as need  to seek emergency treatment.    We have discussed surgery in detail, including risk, benefits, complications, mesh, infection, lifting and activity limits after surgery. She has been given literature to review. We will schedule surgery at patient's convenience.        Gerry Freeman MD    Please route or send letter to:  Primary Care Provider (PCP) and Referring Provider

## 2024-10-22 NOTE — PROGRESS NOTES
"  Assessment & Plan     Non-recurrent unilateral inguinal hernia without obstruction or gangrene  Two reducible hernias noted on exam, non tender to palpation. Tender with certain twisting movements. Discussed importance of continuing to avoid constipation, avoid movements that cause pain or discomfort. Counselled on signs of incarcerated hernia and if these were to present themself should go to ER- nausea, vomiting, severe pain, fevers.   Follow-up in clinic with any worsening pain or symptoms before she's able to get in with gen surg.   General surgery referral provided to consult on options.  - Adult Gen Surg  Referral        Subjective   Nadiya is a 84 year old, presenting for the following health issues:  Hernia        10/22/2024     8:53 AM   Additional Questions   Roomed by Antoinette   Accompanied by self     History of Present Illness       Reason for visit:  Pain in my left intestine  Symptom onset:  1-2 weeks ago   She is taking medications regularly.     Left side of abdomen bulge where she feels sharp pains intermittently     Was seen about a year ago for this bulge and it was not painful then so was told to continue to monitor     Now thinks there is another bulge right below it     Mowing the lawn and raking which caused it to be painful.   Doesn't notice the pain when having a bowel movement, she is very mindful about watching diet to ensure doesn't get constipated.     No nausea or vomiting, no fevers. No blood in stools.       Review of Systems  Constitutional, HEENT, cardiovascular, pulmonary, gi and gu systems are negative, except as otherwise noted.      Objective    /54 (BP Location: Right arm, Patient Position: Sitting, Cuff Size: Adult Regular)   Pulse 58   Temp 97.3  F (36.3  C) (Temporal)   Resp 16   Ht 1.63 m (5' 4.17\")   Wt 60.8 kg (134 lb)   SpO2 96%   BMI 22.88 kg/m    Body mass index is 22.88 kg/m .  Physical Exam   GENERAL: alert and no distress  ABDOMEN: reducible " hernia located left lower abdomen and left inguinal region. soft, nontender, bowel sounds normal  MS: no gross musculoskeletal defects noted, no edema  SKIN: no suspicious lesions or rashes  NEURO: Normal strength and tone, mentation intact and speech normal  PSYCH: mentation appears normal, affect normal/bright            Signed Electronically by: DARWIN Faust CNP

## 2024-10-23 ENCOUNTER — TELEPHONE (OUTPATIENT)
Dept: SURGERY | Facility: CLINIC | Age: 84
End: 2024-10-23
Payer: COMMERCIAL

## 2024-10-23 NOTE — LETTER
2024       Nadiya Drummond  [address] [facility] [address]     RE: [id number]  : 1940    Nadiya Drummond has been scheduled for surgery on 24 at 2:00 PM at [facility] with Dr Gerry Freeman.  The hospital is located at [address] in [place].    Please check in at the Surgery reception desk at 12:00 PM. This is located in the back of the hospital on the East side, just past the Emergency Room entrance.     DO NOT EAT OR DRINK ANYTHING 8 HOURS BEFORE YOUR ARRIVAL TIME.   You may have sips of clear liquids up until 2 hours before your arrival time. If you have been advised to take your medication, please do this early in the morning with just sips of clear liquid.     Hospital regulations require an updated pre-operative examination to be completed within 30 days of the procedure. This can be done by your primary care provider. Please ask them to fax documentation to [phone]. We also recommend you bring a copy with you.     You should shower before your surgery with Hibiclens or Exidine soap.  This can be found at your local pharmacy or you can pick it up from our office for free.  Please call our office if you have any questions.     You will be required to have an Adult (friend or family member) drive you home after your surgery and arrange for an adult to stay with you until the next morning.     You will receive several calls from our staff 3-7 days prior to your scheduled procedure with further details and to answer any questions you may have.    It is sometimes necessary to adjust the surgery schedule due to emergencies and additions to the schedule.  If your surgery is affected by this, we greatly appreciate your flexibility and understanding in this matter    It is best if you call regarding post-operative questions between the hours of 8:00 am & 3:00 pm Monday-Friday, so you have access to the daytime care team that know you best.  Prescription  refills are accepted during regular office hours only.    Please do not bring any Disability or FMLA papers to the hospital.  They need to be either faxed ([phone]), mailed or hand delivered to our office by you or a family member for completion.  Please allow 14 business days to complete paperwork.        If you have questions or concerns, please contact our office at [phone].

## 2024-10-23 NOTE — TELEPHONE ENCOUNTER
Type of surgery: ROBOTIC ASSISTED LEFT INGUINAL HERNIA REPAIR WITH MESH   Location of surgery: [facility]  Date and time of surgery: 12-12-24, 11:30 AM  Surgeon: DR CHACKO   Pre-Op Appt Date: PATIENT TO SCHEDULE   Post-Op Appt Date: NA    Packet sent out: Yes  Pre-cert/Authorization completed:  Not Applicable  Date: 10-23-24        ROBOTIC ASSISTED LEFT INGUINAL HERNIA REPAIR WITH MESH GENERAL PT INST TO HAVE H&P 75 MINS REQ PA ASSIST [facility] ALW

## 2024-10-23 NOTE — LETTER
Showering Before Surgery      Your surgeon has asked you to take 2 showers before surgery.    Why is this important?  It is normal for bacteria (germs) to be on your skin. The skin protects us from these germs. When you have surgery, we cut the skin. Sometimes germs get into the cuts and cause infection (illness caused by germs). By following the instructions below and using special soap, you will lower the number of germs on your skin. This decreases your chance of infection.  Special soap  Buy or get 8 ounces of antiseptic surgical soap called 4% CHG. Common name brands of this soap are Hibiclens and Exidine.  You can find it at your local pharmacy, clinic or retail store. If you have trouble, ask your pharmacist to help you find the right substitute.  A note about shaving:  Do not shave within 12 inches of your incision (surgical cut) area for at least 3 days before surgery. Shaving can make small cuts in the skin. This puts you at a higher risk of infection.  Items you will need for each shower:  1 newly washed towel  4 ounces of one of the above soaps  Clean pajamas or clothes to change into    Follow these instructions:  Follow these steps the evening before surgery and the morning of surgery.  Wash your hair and body with your regular shampoo and soap. Make sure you rinse the shampoo and soap from your hair and body.  Using clean hands, apply about 2 ounces of soap gently on your skin from your ear lobes to your toes. Use on your groin area last. Do not use this soap on your face or head. If you get any soap in your eyes, ears or mouth, rinse right away.  Repeat step 2. It is very important to let the soap stay on your skin for at least 1 minute.    Rinse well and dry off using a clean towel.    If you feel any tingling, itching or other irritation, rinse right away. It is normal to feel some coolness on the skin after using the antiseptic soap. Your skin may feel a bit dry after the shower, but do not use  any lotions, creams or moisturizers. Do not use hair spray or other products in your hair.  5.  Dress in freshly washed clothes or pajamas. Use fresh pillowcases and sheets on your bed.    Repeat these steps the morning of surgery.  If you have any questions about showering or an allergy to CHG soap, please call your surgery center.    For informational purposes only. Not to replace the advice of your health care provider. Copyright   2012 [facility]. All rights reserved. Clinically reviewed by [employer]. Mor.sl [id number] - REV 12

## 2024-12-02 ENCOUNTER — OFFICE VISIT (OUTPATIENT)
Dept: FAMILY MEDICINE | Facility: CLINIC | Age: 84
End: 2024-12-02
Payer: COMMERCIAL

## 2024-12-02 VITALS
TEMPERATURE: 97.7 F | RESPIRATION RATE: 16 BRPM | WEIGHT: 132.7 LBS | DIASTOLIC BLOOD PRESSURE: 60 MMHG | OXYGEN SATURATION: 98 % | HEART RATE: 66 BPM | SYSTOLIC BLOOD PRESSURE: 130 MMHG | BODY MASS INDEX: 22.65 KG/M2 | HEIGHT: 64 IN

## 2024-12-02 DIAGNOSIS — Z01.818 PREOP GENERAL PHYSICAL EXAM: Primary | ICD-10-CM

## 2024-12-02 DIAGNOSIS — R73.03 PREDIABETES: ICD-10-CM

## 2024-12-02 DIAGNOSIS — Z13.220 LIPID SCREENING: ICD-10-CM

## 2024-12-02 DIAGNOSIS — K40.90 INGUINAL HERNIA, LEFT: ICD-10-CM

## 2024-12-02 LAB
CHOLEST SERPL-MCNC: 214 MG/DL
EST. AVERAGE GLUCOSE BLD GHB EST-MCNC: 108 MG/DL
FASTING STATUS PATIENT QL REPORTED: YES
HBA1C MFR BLD: 5.4 % (ref 0–5.6)
HDLC SERPL-MCNC: 77 MG/DL
LDLC SERPL CALC-MCNC: 127 MG/DL
NONHDLC SERPL-MCNC: 137 MG/DL
TRIGL SERPL-MCNC: 48 MG/DL

## 2024-12-02 PROCEDURE — 99214 OFFICE O/P EST MOD 30 MIN: CPT | Performed by: STUDENT IN AN ORGANIZED HEALTH CARE EDUCATION/TRAINING PROGRAM

## 2024-12-02 PROCEDURE — 36415 COLL VENOUS BLD VENIPUNCTURE: CPT | Performed by: STUDENT IN AN ORGANIZED HEALTH CARE EDUCATION/TRAINING PROGRAM

## 2024-12-02 PROCEDURE — 83036 HEMOGLOBIN GLYCOSYLATED A1C: CPT | Performed by: STUDENT IN AN ORGANIZED HEALTH CARE EDUCATION/TRAINING PROGRAM

## 2024-12-02 PROCEDURE — 80061 LIPID PANEL: CPT | Performed by: STUDENT IN AN ORGANIZED HEALTH CARE EDUCATION/TRAINING PROGRAM

## 2024-12-02 ASSESSMENT — PAIN SCALES - GENERAL: PAINLEVEL_OUTOF10: NO PAIN (0)

## 2024-12-02 NOTE — PROGRESS NOTES
Preoperative Evaluation  [facility]  [address]  [address]  [place] [address]  Phone: [phone]  Fax: [phone]  Primary Provider: Carmen Beckett MD  Pre-op Performing Provider: Nelson Moseley PA-C  Dec 2, 2024             12/1/2024   Surgical Information   What procedure is being done? surgery pre operation    Facility or Hospital where procedure/surgery will be performed: [facility] in [place]    Who is doing the procedure / surgery? hernia surgery    Date of surgery / procedure: dec 12,2024    Time of surgery / procedure: dec 12, 2024    Where do you plan to recover after surgery? Other - Pt stated about probably staying overnight at the hospital, since she doesn't have anyone unless it's at [facility].        Patient-reported     Fax number for surgical facility: Note does not need to be faxed, will be available electronically in Epic.    Assessment & Plan     The proposed surgical procedure is considered INTERMEDIATE risk.    Preop general physical exam  Inguinal hernia, left  Prediabetes  - Hemoglobin A1c; Future    - Recent labs in May 2024 normal outside of A1c of 5.7. Recheck A1c today. Also obtain lipid panel given she is overdue for this. No EKG indicated with no history of heart disease, arrhythmia, insulin dependent diabetes, CKD, or PAD, but normal echo was done recently in June 2024 in the setting of PVCs.      - No identified additional risk factors other than previously addressed    Antiplatelet or Anticoagulation Medication Instructions   - Patient is on no antiplatelet or anticoagulation medications.    Additional Medication Instructions  Patient is on no additional chronic medications   - Herbal medications and vitamins: DO NOT TAKE 14 days prior to surgery.   - NSAIDs: DO NOT TAKE 3 days before surgery.    Recommendation  Approval given to proceed with proposed procedure with normal A1c.     Subjective   Nadiya is a 84 year old,  presenting for the following:  Pre-Op Exam (For Robotic assisted left inguinal hernia repair with mesh with Dr. Freeman on 12/12/24 at 11:30am at [facility].)          12/2/2024     7:52 AM   Additional Questions   Roomed by Brenda KHANNA MA   Accompanied by Self     HPI related to upcoming procedure: Two left inguinal hernia x1 year that is being repaired by Dr. Freeman on 12/12/24.        12/1/2024   Pre-Op Questionnaire   Have you ever had a heart attack or stroke? No    Have you ever had surgery on your heart or blood vessels, such as a stent placement, a coronary artery bypass, or surgery on an artery in your head, neck, heart, or legs? No    Do you have chest pain with activity? No    Do you have a history of heart failure? No    Do you currently have a cold, bronchitis or symptoms of other infection? No    Do you have a cough, shortness of breath, or wheezing? No    Do you or anyone in your family have previous history of blood clots? No    Do you or does anyone in your family have a serious bleeding problem such as prolonged bleeding following surgeries or cuts? No    Have you ever had problems with anemia or been told to take iron pills? No    Have you had any abnormal blood loss such as black, tarry or bloody stools, or abnormal vaginal bleeding? No    Have you ever had a blood transfusion? No    Are you willing to have a blood transfusion if it is medically needed before, during, or after your surgery? Yes    Have you or any of your relatives ever had problems with anesthesia? No    Do you have sleep apnea, excessive snoring or daytime drowsiness? No    Do you have any artifical heart valves or other implanted medical devices like a pacemaker, defibrillator, or continuous glucose monitor? No    Do you have artificial joints? No    Are you allergic to latex? No        Patient-reported     Health Care Directive  Patient does not have a Health Care Directive: Discussed advance care planning  with patient; information given to patient to review.    Preoperative Review of    reviewed - no record of controlled substances prescribed.          Patient Active Problem List    Diagnosis Date Noted    Inguinal hernia, left 2024     Priority: Medium    Full dentures 10/08/2019     Priority: Medium    Ganglion cyst of both feet 10/08/2019     Priority: Medium    Osteopenia of multiple sites 10/08/2019     Priority: Medium    Cataract 2014     Priority: Medium    Chronic maxillary sinusitis 2013     Priority: Medium    Onychomycosis 2012     Priority: Medium    Hammer toe 2012     Priority: Medium    Lipoma of skin and subcutaneous tissue 2012     Priority: Medium    Advanced care planning/counseling discussion 2012     Priority: Medium    Localized osteoporosis without current pathological fracture 2012     Priority: Medium    CARDIOVASCULAR SCREENING; LDL GOAL LESS THAN 130 2010     Priority: Medium      Past Medical History:   Diagnosis Date    Lactose intolerance     TMJ (temporomandibular joint disorder)     Tobacco abuse      Past Surgical History:   Procedure Laterality Date    BLEPHAROPLASTY Bilateral 2002    BUNIONECTOMY Left 2007    CATARACT IOL, RT/LT Bilateral 2007    ORTHOPEDIC SURGERY      REPAIR HAMMER TOE Bilateral 2024    Procedure: 1.  HAMMERTOE REPAIR RIGHT 2ND TOE; 2.  BILATERAL BONE SPUR RESECTION;  Surgeon: Justin Sanchez DPM;  Location:  OR     Current Outpatient Medications   Medication Sig Dispense Refill    CALCIUM-VITAMIN D PO       Multiple Vitamins-Minerals (OCUVITE PO) Eye vitamins daily         Allergies   Allergen Reactions    Alendronate Muscle Pain (Myalgia)        Social History     Tobacco Use    Smoking status: Former     Current packs/day: 0.00     Types: Cigarettes     Start date: 1965     Quit date: 2022     Years since quittin.5     Passive exposure: Past    Smokeless  "tobacco: Never    Tobacco comments:     no comment   Substance Use Topics    Alcohol use: No       History   Drug Use No               Objective    /60 (BP Location: Left arm, Patient Position: Sitting, Cuff Size: Adult Small)   Pulse 66   Temp 97.7  F (36.5  C) (Temporal)   Resp 16   Ht 1.626 m (5' 4\")   Wt 60.2 kg (132 lb 11.2 oz)   SpO2 98%   BMI 22.78 kg/m     Estimated body mass index is 22.78 kg/m  as calculated from the following:    Height as of this encounter: 1.626 m (5' 4\").    Weight as of this encounter: 60.2 kg (132 lb 11.2 oz).  Physical Exam  GENERAL: alert and no distress  EYES: Eyes grossly normal to inspection,conjunctivae and sclerae normal  NECK: no adenopathy, no asymmetry, masses, or scars. No thyromegaly or nodules palpated.  RESP: lungs clear to auscultation - no rales, rhonchi or wheezes  CV: regular rate and rhythm, normal S1 S2, no S3 or S4, no murmur, click or rub, no peripheral edema  NEURO: Normal strength and tone, mentation intact and speech normal  PSYCH: mentation appears normal, affect normal/bright      Recent Labs   Lab Test 05/15/24  0850      POTASSIUM 4.4   CR 0.70   A1C 5.7*        Revised Cardiac Risk Index (RCRI)  The patient has the following serious cardiovascular risks for perioperative complications:   - No serious cardiac risks = 0 points     RCRI Interpretation: 0 points: Class I (very low risk - 0.4% complication rate)         Signed Electronically by: Nelson Moseley PA-C  A copy of this evaluation report is provided to the requesting physician.         "

## 2024-12-02 NOTE — PATIENT INSTRUCTIONS
How to Take Your Medication Before Surgery  Preoperative Medication Instructions   Antiplatelet or Anticoagulation Medication Instructions   - Patient is on no antiplatelet or anticoagulation medications.    Additional Medication Instructions  Patient is on no additional chronic medications   - Herbal medications and vitamins: DO NOT TAKE 14 days prior to surgery.   - NSAIDs: DO NOT TAKE 3 days before surgery.       Patient Education   Preparing for Your Surgery  For Adults  Getting started  In most cases, a nurse will call to review your health history and instructions. They will give you an arrival time based on your scheduled surgery time. Please be ready to share:  Your doctor's clinic name and phone number  Your medical, surgical, and anesthesia history  A list of allergies and sensitivities  A list of medicines, including herbal treatments and over-the-counter drugs  Whether the patient has a legal guardian (ask how to send us the papers in advance)  Note: You may not receive a call if you were seen at our PAC (Preoperative Assessment Center).  Please tell us if you're pregnant--or if there's any chance you might be pregnant. Some surgeries may injure a fetus (unborn baby), so they require a pregnancy test. Surgeries that are safe for a fetus don't always need a test, and you can choose whether to have one.   Preparing for surgery  Within 10 to 30 days of surgery: Have a pre-op exam (sometimes called an H&P, or History and Physical). This can be done at a clinic or pre-operative center.  If you're having a , you may not need this exam. Talk to your care team.  At your pre-op exam, talk to your care team about all medicines you take. (This includes CBD oil and any drugs, such as THC, marijuana, and other forms of cannabis.) If you need to stop any medicine before surgery, ask when to start taking it again.  This is for your safety. Many medicines and drugs can make you bleed too much during surgery.  Some change how well surgery (anesthesia) drugs work.  Call your insurance company to let them know you're having surgery. (If you don't have insurance, call [phone].)  Call your clinic if there's any change in your health. This includes a scrape or scratch near the surgery site, or any signs of a cold (sore throat, runny nose, cough, rash, fever).  Eating and drinking guidelines  For your safety: Unless your surgeon tells you otherwise, follow the guidelines below.  Eat and drink as normal until 8 hours before you arrive for surgery. After that, no food or milk. You can spit out gum when you arrive.  Drink clear liquids until 2 hours before you arrive. These are liquids you can see through, like water, Gatorade, and Propel Water. They also include plain black coffee and tea (no cream or milk).  No alcohol for 24 hours before you arrive. The night before surgery, stop any drinks that contain THC.  If your care team tells you to take medicine on the morning of surgery, it's okay to take it with a sip of water. No other medicines or drugs are allowed (including CBD oil)--follow your care team's instructions.  If you have questions the day of surgery, call your hospital or surgery center.   Preventing infection  Shower or bathe the night before and the morning of surgery. Follow the instructions your clinic gave you. (If no instructions, use regular soap.)  Don't shave or clip hair near your surgery site. We'll remove the hair if needed.  Don't smoke or vape the morning of surgery. No chewing tobacco for 6 hours before you arrive. A nicotine patch is okay. You may spit out nicotine gum when you arrive.  For some surgeries, the surgeon will tell you to fully quit smoking and nicotine.  We will make every effort to keep you safe from infection. We will:  Clean our hands often with soap and water (or an alcohol-based hand rub).  Clean the skin at your surgery site with a special soap that kills germs.  Give you a  special gown to keep you warm. (Cold raises the risk of infection.)  Wear hair covers, masks, gowns, and gloves during surgery.  Give antibiotic medicine, if prescribed. Not all surgeries need this medicine.  What to bring on the day of surgery  Photo ID and insurance card  Copy of your health care directive, if you have one  Glasses and hearing aids (bring cases)  You can't wear contacts during surgery  Inhaler and eye drops, if you use them (tell us about these when you arrive)  CPAP machine or breathing device, if you use them  A few personal items, if spending the night  If you have . . .  A pacemaker, ICD (cardiac defibrillator), or other implant: Bring the ID card.  An implanted stimulator: Bring the remote control.  A legal guardian: Bring a copy of the certified (court-stamped) guardianship papers.  Please remove any jewelry, including body piercings. Leave jewelry and other valuables at home.  If you're going home the day of surgery  You must have a responsible adult drive you home. They should stay with you overnight as well.  If you don't have someone to stay with you, and you aren't safe to go home alone, we may keep you overnight. Insurance often won't pay for this.  After surgery  If it's hard to control your pain or you need more pain medicine, please call your surgeon's office.  Questions?   If you have any questions for your care team, list them here:   ____________________________________________________________________________________________________________________________________________________________________________________________________________________________________________________________  For informational purposes only. Not to replace the advice of your health care provider. Copyright   2003, 2019 [facility]. All rights reserved. Clinically reviewed by Benito Wheeler MD. [employer] [id number] - REV 08/24.

## 2024-12-12 ENCOUNTER — ANESTHESIA (OUTPATIENT)
Dept: SURGERY | Facility: CLINIC | Age: 84
End: 2024-12-12
Payer: COMMERCIAL

## 2024-12-12 ENCOUNTER — ANESTHESIA EVENT (OUTPATIENT)
Dept: SURGERY | Facility: CLINIC | Age: 84
End: 2024-12-12
Payer: COMMERCIAL

## 2024-12-12 PROCEDURE — 258N000003 HC RX IP 258 OP 636: Performed by: NURSE ANESTHETIST, CERTIFIED REGISTERED

## 2024-12-12 PROCEDURE — 250N000011 HC RX IP 250 OP 636: Performed by: SURGERY

## 2024-12-12 PROCEDURE — 250N000009 HC RX 250: Performed by: NURSE ANESTHETIST, CERTIFIED REGISTERED

## 2024-12-12 PROCEDURE — 250N000011 HC RX IP 250 OP 636: Performed by: NURSE ANESTHETIST, CERTIFIED REGISTERED

## 2024-12-12 RX ORDER — PROPOFOL 10 MG/ML
INJECTION, EMULSION INTRAVENOUS CONTINUOUS PRN
Status: DISCONTINUED | OUTPATIENT
Start: 2024-12-12 | End: 2024-12-12

## 2024-12-12 RX ORDER — LABETALOL HYDROCHLORIDE 5 MG/ML
INJECTION, SOLUTION INTRAVENOUS PRN
Status: DISCONTINUED | OUTPATIENT
Start: 2024-12-12 | End: 2024-12-12

## 2024-12-12 RX ORDER — FENTANYL CITRATE 50 UG/ML
INJECTION, SOLUTION INTRAMUSCULAR; INTRAVENOUS PRN
Status: DISCONTINUED | OUTPATIENT
Start: 2024-12-12 | End: 2024-12-12

## 2024-12-12 RX ORDER — PROPOFOL 10 MG/ML
INJECTION, EMULSION INTRAVENOUS PRN
Status: DISCONTINUED | OUTPATIENT
Start: 2024-12-12 | End: 2024-12-12

## 2024-12-12 RX ORDER — DEXAMETHASONE SODIUM PHOSPHATE 4 MG/ML
INJECTION, SOLUTION INTRA-ARTICULAR; INTRALESIONAL; INTRAMUSCULAR; INTRAVENOUS; SOFT TISSUE PRN
Status: DISCONTINUED | OUTPATIENT
Start: 2024-12-12 | End: 2024-12-12

## 2024-12-12 RX ORDER — GLYCOPYRROLATE 0.2 MG/ML
INJECTION, SOLUTION INTRAMUSCULAR; INTRAVENOUS PRN
Status: DISCONTINUED | OUTPATIENT
Start: 2024-12-12 | End: 2024-12-12

## 2024-12-12 RX ORDER — LIDOCAINE HYDROCHLORIDE 20 MG/ML
INJECTION, SOLUTION INFILTRATION; PERINEURAL PRN
Status: DISCONTINUED | OUTPATIENT
Start: 2024-12-12 | End: 2024-12-12

## 2024-12-12 RX ORDER — SODIUM CHLORIDE, SODIUM LACTATE, POTASSIUM CHLORIDE, CALCIUM CHLORIDE 600; 310; 30; 20 MG/100ML; MG/100ML; MG/100ML; MG/100ML
INJECTION, SOLUTION INTRAVENOUS CONTINUOUS PRN
Status: DISCONTINUED | OUTPATIENT
Start: 2024-12-12 | End: 2024-12-12

## 2024-12-12 RX ADMIN — PROPOFOL 100 MG: 10 INJECTION, EMULSION INTRAVENOUS at 11:48

## 2024-12-12 RX ADMIN — DEXAMETHASONE SODIUM PHOSPHATE 8 MG: 4 INJECTION, SOLUTION INTRA-ARTICULAR; INTRALESIONAL; INTRAMUSCULAR; INTRAVENOUS; SOFT TISSUE at 11:48

## 2024-12-12 RX ADMIN — LIDOCAINE HYDROCHLORIDE 30 MG: 20 INJECTION, SOLUTION INFILTRATION; PERINEURAL at 11:48

## 2024-12-12 RX ADMIN — Medication 2 G: at 11:41

## 2024-12-12 RX ADMIN — ROCURONIUM BROMIDE 30 MG: 50 INJECTION, SOLUTION INTRAVENOUS at 11:48

## 2024-12-12 RX ADMIN — PROPOFOL 50 MCG/KG/MIN: 10 INJECTION, EMULSION INTRAVENOUS at 11:49

## 2024-12-12 RX ADMIN — ROCURONIUM BROMIDE 10 MG: 50 INJECTION, SOLUTION INTRAVENOUS at 12:31

## 2024-12-12 RX ADMIN — FENTANYL CITRATE 50 MCG: 50 INJECTION INTRAMUSCULAR; INTRAVENOUS at 12:09

## 2024-12-12 RX ADMIN — GLYCOPYRROLATE 0.2 MG: 0.2 INJECTION, SOLUTION INTRAMUSCULAR; INTRAVENOUS at 11:48

## 2024-12-12 RX ADMIN — SUGAMMADEX 150 MG: 100 INJECTION, SOLUTION INTRAVENOUS at 13:00

## 2024-12-12 RX ADMIN — SODIUM CHLORIDE, POTASSIUM CHLORIDE, SODIUM LACTATE AND CALCIUM CHLORIDE: 600; 310; 30; 20 INJECTION, SOLUTION INTRAVENOUS at 11:39

## 2024-12-12 RX ADMIN — ROCURONIUM BROMIDE 10 MG: 50 INJECTION, SOLUTION INTRAVENOUS at 12:09

## 2024-12-12 RX ADMIN — LABETALOL HYDROCHLORIDE 5 MG: 5 INJECTION, SOLUTION INTRAVENOUS at 12:20

## 2024-12-12 RX ADMIN — FENTANYL CITRATE 50 MCG: 50 INJECTION INTRAMUSCULAR; INTRAVENOUS at 12:04

## 2024-12-12 RX ADMIN — FENTANYL CITRATE 100 MCG: 50 INJECTION INTRAMUSCULAR; INTRAVENOUS at 11:48

## 2024-12-12 ASSESSMENT — ENCOUNTER SYMPTOMS: DYSRHYTHMIAS: 0

## 2024-12-12 ASSESSMENT — LIFESTYLE VARIABLES: TOBACCO_USE: 1

## 2024-12-12 NOTE — ANESTHESIA POSTPROCEDURE EVALUATION
Patient: Nadiya Drummond    Procedure: Procedure(s):  Robotic assisted bilateral inguinal hernia repair with mesh and ventral hernia repair       Anesthesia Type:  General    Note:     Postop Pain Control: Uneventful            Sign Out: Well controlled pain   PONV: No   Neuro/Psych: Uneventful            Sign Out: Acceptable/Baseline neuro status   Airway/Respiratory:             Sign Out: Acceptable/Baseline resp. status   CV/Hemodynamics:             Sign Out: Acceptable CV status   Other NRE:    DID A NON-ROUTINE EVENT OCCUR?            Last vitals:  Vitals Value Taken Time   /56 12/12/24 1400   Temp 97.34  F (36.3  C) 12/12/24 1402   Pulse 80 12/12/24 1403   Resp 23 12/12/24 1403   SpO2 97 % 12/12/24 1403   Vitals shown include unfiled device data.    Electronically Signed By: Aguilar Hollis DO  December 12, 2024  2:04 PM

## 2024-12-12 NOTE — ANESTHESIA PREPROCEDURE EVALUATION
Anesthesia Pre-Procedure Evaluation    Patient: Nadiya Drummond   MRN: [id number] : 1940        Procedure : Procedure(s):  Robotic assisted left inguinal hernia repair with mesh          Past Medical History:   Diagnosis Date    Lactose intolerance     TMJ (temporomandibular joint disorder)     Tobacco abuse       Past Surgical History:   Procedure Laterality Date    BLEPHAROPLASTY Bilateral 2002    BUNIONECTOMY Left 2007    CATARACT IOL, RT/LT Bilateral 2007    ORTHOPEDIC SURGERY      REPAIR HAMMER TOE Bilateral 2024    Procedure: 1.  HAMMERTOE REPAIR RIGHT 2ND TOE; 2.  BILATERAL BONE SPUR RESECTION;  Surgeon: Justin Sanchez DPM;  Location: [facility] OR      Allergies   Allergen Reactions    Alendronate Muscle Pain (Myalgia)      Social History     Tobacco Use    Smoking status: Former     Current packs/day: 0.00     Types: Cigarettes     Start date: 1965     Quit date: 2022     Years since quittin.5     Passive exposure: Past    Smokeless tobacco: Never    Tobacco comments:     no comment   Substance Use Topics    Alcohol use: No      Wt Readings from Last 1 Encounters:   24 59.8 kg (131 lb 12.8 oz)        Anesthesia Evaluation   Pt has had prior anesthetic. Type: Regional and MAC.    No history of anesthetic complications       ROS/MED HX  ENT/Pulmonary: Comment: TMJ disorder    (+)                tobacco use, Past use,                    (-) asthma and recent URI   Neurologic:  - neg neurologic ROS     Cardiovascular:  - neg cardiovascular ROS   (+)  - -   -  - -                                 Previous cardiac testing   Echo: Date: 2024 Results:  Interpretation Summary     Left ventricular systolic function is normal.  The visual ejection fraction is 55-60%.  The left ventricle is normal in size.  The right ventricle is normal in structure, function and size.  The left atrium is moderate to severely dilated.  There is mild (1+) aortic regurgitation.  No old  "studies for comparison    Stress Test:  Date: Results:    ECG Reviewed:  Date: 4/2024 Results:  Sinus rhythm with frequent Premature ventricular complexes and Premature atrial complexes   Otherwise normal ECG     Cath:  Date: Results:   (-) arrhythmias and irregular heartbeat/palpitations   METS/Exercise Tolerance:     Hematologic:       Musculoskeletal:       GI/Hepatic:  - neg GI/hepatic ROS  (-) GERD   Renal/Genitourinary:  - neg Renal ROS     Endo:  - neg endo ROS     Psychiatric/Substance Use:       Infectious Disease:       Malignancy:       Other:            Physical Exam    Airway        Mallampati: II   TM distance: > 3 FB   Neck ROM: full   Mouth opening: > 3 cm    Respiratory Devices and Support         Dental       (+) Edentulous and Removable bridges or other hardware      Cardiovascular   cardiovascular exam normal          Pulmonary   pulmonary exam normal                OUTSIDE LABS:  CBC:   Lab Results   Component Value Date    WBC 5.1 10/06/2023    WBC 5.8 04/13/2023    HGB 12.6 10/06/2023    HGB 12.0 04/13/2023    HCT 37.4 10/06/2023    HCT 36.1 04/13/2023     10/06/2023     04/13/2023     BMP:   Lab Results   Component Value Date     05/15/2024     04/13/2023    POTASSIUM 4.4 05/15/2024    POTASSIUM 4.1 04/13/2023    CHLORIDE 107 05/15/2024    CHLORIDE 106 04/13/2023    CO2 25 05/15/2024    CO2 21 (L) 04/13/2023    BUN 8.4 05/15/2024    BUN 13.4 04/13/2023    CR 0.70 05/15/2024    CR 0.71 04/13/2023    GLC 99 05/15/2024    GLC 86 04/13/2023     COAGS: No results found for: \"PTT\", \"INR\", \"FIBR\"  POC: No results found for: \"BGM\", \"HCG\", \"HCGS\"  HEPATIC:   Lab Results   Component Value Date    ALBUMIN 4.3 04/13/2023    PROTTOTAL 6.6 04/13/2023    ALT 18 04/13/2023    AST 32 04/13/2023    ALKPHOS 67 04/13/2023    BILITOTAL 0.6 04/13/2023     OTHER:   Lab Results   Component Value Date    A1C 5.4 12/02/2024    RAZA 9.3 05/15/2024    TSH 2.68 05/15/2024    SED 7 02/19/2009 "       Anesthesia Plan    ASA Status:  2    NPO Status:  NPO Appropriate    Anesthesia Type: General.     - Airway: ETT   Induction: Intravenous.   Maintenance: Balanced.        Consents    Anesthesia Plan(s) and associated risks, benefits, and realistic alternatives discussed. Questions answered and patient/representative(s) expressed understanding.     - Discussed:     - Discussed with:  Patient      - Extended Intubation/Ventilatory Support Discussed: No.      - Patient is DNR/DNI Status: No     Use of blood products discussed: No .     Postoperative Care    Pain management: IV analgesics, Oral pain medications, Multi-modal analgesia.   PONV prophylaxis: Ondansetron (or other 5HT-3), Dexamethasone or Solumedrol     Comments:               Yareli Beard MD    I have reviewed the pertinent notes and labs in the chart from the past 30 days and (re)examined the patient.  Any updates or changes from those notes are reflected in this note.

## 2024-12-12 NOTE — ANESTHESIA PROCEDURE NOTES
Airway       Patient location during procedure: OR       Procedure Start/Stop Times: 12/12/2024 11:52 AM  Staff -        Anesthesiologist:  Aguilar Hollis DO       CRNA: Mikaela Morales APRN CRNA       Performed By: anesthesiologist and CRNA  Consent for Airway        Urgency: elective  Indications and Patient Condition       Indications for airway management: juan-procedural       Induction type:intravenous       Mask difficulty assessment: 1 - vent by mask    Final Airway Details       Final airway type: endotracheal airway       Successful airway: ETT - single and Oral  Endotracheal Airway Details        ETT size (mm): 7.0       Cuffed: yes       Successful intubation technique: direct laryngoscopy       DL Blade Type: Vital 2       Grade View of Cords: 1       Adjucts: stylet       Position: Right       Measured from: gums/teeth       Secured at (cm): 20       Bite block used: None    Post intubation assessment        Placement verified by: capnometry, equal breath sounds and chest rise        Number of attempts at approach: 1       Number of other approaches attempted: 0       Secured with: tape       Ease of procedure: easy       Dentition: Unchanged (edentulous)    Medication(s) Administered   Medication Administration Time: 12/12/2024 11:52 AM

## 2024-12-12 NOTE — ANESTHESIA CARE TRANSFER NOTE
Patient: Nadiya Drummond    Procedure: Procedure(s):  Robotic assisted bilateral inguinal hernia repair with mesh and ventral hernia repair       Diagnosis: Left inguinal hernia [K40.90]  Diagnosis Additional Information: No value filed.    Anesthesia Type:   General     Note:      Level of Consciousness: awake  Oxygen Supplementation: nasal cannula    Independent Airway: airway patency satisfactory and stable        Patient transferred to: PACU    Handoff Report: Identifed the Patient, Identified the Reponsible Provider, Reviewed the pertinent medical history, Discussed the surgical course, Reviewed Intra-OP anesthesia mangement and issues during anesthesia, Set expectations for post-procedure period and Allowed opportunity for questions and acknowledgement of understanding      Vitals:  Vitals Value Taken Time   /56 12/12/24 1400   Temp 97.34  F (36.3  C) 12/12/24 1402   Pulse 80 12/12/24 1403   Resp 23 12/12/24 1403   SpO2 97 % 12/12/24 1403   Vitals shown include unfiled device data.    Electronically Signed By: Aguilar Hollis DO  December 12, 2024  2:04 PM

## 2024-12-19 ENCOUNTER — TELEPHONE (OUTPATIENT)
Dept: SURGERY | Facility: CLINIC | Age: 84
End: 2024-12-19
Payer: COMMERCIAL

## 2024-12-19 NOTE — TELEPHONE ENCOUNTER
Name of caller: Patient    Reason for Call:  Calling today to inform you about the drainage she is having from her incision and lump under the incision. Just wants to make sure its not an infection.     Surgeon:  Dr. Freeman    Recent Surgery:  Yes.    If yes, when & what type:    Robotic assisted repair of bilateral inguinal hernias, bilateral femoral hernias and ventral hernia (4 cm)  12/12     Best phone number to reach pt at is: [phone]  Ok to leave a message with medical info? Yes.    Pharmacy preferred (if calling for a refill): N/A

## 2024-12-19 NOTE — TELEPHONE ENCOUNTER
S/p Robotic assisted repair of bilateral inguinal hernias, bilateral femoral hernias and ventral hernia (4 cm)    Procedure date: 12/12/24  Surgeon Dr. Freeman    Patient is calling to report that she initially had some thin, watery pink drainage from mid-abdominal incision.    This has since stopped.  She also notes a firm lump just under this incision.    Wondering if this is normal?  Wants to let us know to be sure this isn't infection.      She denies any redness, thick drainage, fever or chills.  Notes small area of purplish bruising below mid- abdominal incision.   No worsening pain.      Reassured patient.    Discussed s/s of infection and when to call our office.  She should expect gradual improvement.    She will call if any new or worsening symptoms.

## 2024-12-23 ENCOUNTER — TELEPHONE (OUTPATIENT)
Dept: SURGERY | Facility: CLINIC | Age: 84
End: 2024-12-23
Payer: COMMERCIAL

## 2024-12-23 NOTE — TELEPHONE ENCOUNTER
Patient called back to report that she looked at the incision again.  The redness is running just along in the incision site itself - it does not spread out into surrounding skin.    She feels that it looks fine now and [facility]      No further drainage from incision, no swelling or increased tenderness or pain.      Advised Nadiya to monitor the incision site.  She is to call if redness it spreading out into surrounding skin and getting bigger.  Or if any thick drainage, swelling, or increased tenderness/pain at incision site.      Patient verbalizes understanding and is in agreement with this plan.

## 2024-12-23 NOTE — TELEPHONE ENCOUNTER
Name of caller: Patient    Reason for Call:  Draining incision    Surgeon:  Dr. Freeman    Recent Surgery:  Yes.    If yes, when & what type:  12/12/24 hernia      Best phone number to reach pt at is: [phone]  Ok to leave a message with medical info? Yes.    Pharmacy preferred (if calling for a refill): na

## 2024-12-23 NOTE — TELEPHONE ENCOUNTER
S/p Robotic assisted repair of bilateral inguinal hernias, bilateral femoral hernias and ventral hernia (4 cm)   Procedure date: 12/12/24  Surgeon: Dr. Freeman    Patient reports that the incision at umbilicus has no longer been draining.  However,  left abdominal incision started to drain some yellowish drainage over the weekend.     It looks a little bit thick.  She has some redness surrounding the incision ; approximately 1inch long x 1/2 wide.    No increased pain or tenderness at the site.  No fever or chills.      [facility]  RN will forward image to PA for input and plan.      She would like any rx to be sent to [facility] on [address] in [place].

## 2025-02-23 ENCOUNTER — HEALTH MAINTENANCE LETTER (OUTPATIENT)
Age: 85
End: 2025-02-23

## 2025-03-21 ENCOUNTER — APPOINTMENT (OUTPATIENT)
Dept: GENERAL RADIOLOGY | Facility: CLINIC | Age: 85
End: 2025-03-21
Attending: EMERGENCY MEDICINE
Payer: COMMERCIAL

## 2025-03-21 ENCOUNTER — HOSPITAL ENCOUNTER (EMERGENCY)
Facility: CLINIC | Age: 85
Discharge: HOME | End: 2025-03-21
Attending: EMERGENCY MEDICINE | Admitting: EMERGENCY MEDICINE
Payer: COMMERCIAL

## 2025-03-21 VITALS
HEIGHT: 64 IN | TEMPERATURE: 99.1 F | HEART RATE: 65 BPM | OXYGEN SATURATION: 100 % | RESPIRATION RATE: 10 BRPM | WEIGHT: 135 LBS | BODY MASS INDEX: 23.05 KG/M2 | DIASTOLIC BLOOD PRESSURE: 81 MMHG | SYSTOLIC BLOOD PRESSURE: 193 MMHG

## 2025-03-21 DIAGNOSIS — I95.1 ORTHOSTATIC HYPOTENSION: ICD-10-CM

## 2025-03-21 DIAGNOSIS — I95.1 ORTHOSTASIS: ICD-10-CM

## 2025-03-21 DIAGNOSIS — R00.2 PALPITATIONS: ICD-10-CM

## 2025-03-21 DIAGNOSIS — R73.09 ELEVATED GLUCOSE: ICD-10-CM

## 2025-03-21 DIAGNOSIS — I10 ELEVATED BLOOD PRESSURE READING WITH DIAGNOSIS OF HYPERTENSION: ICD-10-CM

## 2025-03-21 DIAGNOSIS — I49.3 FREQUENT PVCS: ICD-10-CM

## 2025-03-21 LAB
ALBUMIN SERPL BCG-MCNC: 4.2 G/DL (ref 3.5–5.2)
ALP SERPL-CCNC: 83 U/L (ref 40–150)
ALT SERPL W P-5'-P-CCNC: 13 U/L (ref 0–50)
ANION GAP SERPL CALCULATED.3IONS-SCNC: 11 MMOL/L (ref 7–15)
AST SERPL W P-5'-P-CCNC: 27 U/L (ref 0–45)
ATRIAL RATE - MUSE: 87 BPM
BASOPHILS # BLD AUTO: 0.1 10E3/UL (ref 0–0.2)
BASOPHILS NFR BLD AUTO: 1 %
BILIRUB SERPL-MCNC: 0.6 MG/DL
BUN SERPL-MCNC: 8.6 MG/DL (ref 8–23)
CALCIUM SERPL-MCNC: 9.2 MG/DL (ref 8.8–10.4)
CHLORIDE SERPL-SCNC: 106 MMOL/L (ref 98–107)
CREAT SERPL-MCNC: 0.66 MG/DL (ref 0.51–0.95)
DIASTOLIC BLOOD PRESSURE - MUSE: NORMAL MMHG
EGFRCR SERPLBLD CKD-EPI 2021: 87 ML/MIN/1.73M2
EOSINOPHIL # BLD AUTO: 0.4 10E3/UL (ref 0–0.7)
EOSINOPHIL NFR BLD AUTO: 8 %
ERYTHROCYTE [DISTWIDTH] IN BLOOD BY AUTOMATED COUNT: 12.3 % (ref 10–15)
GLUCOSE SERPL-MCNC: 106 MG/DL (ref 70–99)
HCO3 SERPL-SCNC: 23 MMOL/L (ref 22–29)
HCT VFR BLD AUTO: 40 % (ref 35–47)
HGB BLD-MCNC: 13.4 G/DL (ref 11.7–15.7)
HOLD SPECIMEN: NORMAL
IMM GRANULOCYTES # BLD: 0 10E3/UL
IMM GRANULOCYTES NFR BLD: 0 %
INTERPRETATION ECG - MUSE: NORMAL
LYMPHOCYTES # BLD AUTO: 1.5 10E3/UL (ref 0.8–5.3)
LYMPHOCYTES NFR BLD AUTO: 27 %
MAGNESIUM SERPL-MCNC: 2 MG/DL (ref 1.7–2.3)
MCH RBC QN AUTO: 29.3 PG (ref 26.5–33)
MCHC RBC AUTO-ENTMCNC: 33.5 G/DL (ref 31.5–36.5)
MCV RBC AUTO: 88 FL (ref 78–100)
MONOCYTES # BLD AUTO: 0.6 10E3/UL (ref 0–1.3)
MONOCYTES NFR BLD AUTO: 10 %
NEUTROPHILS # BLD AUTO: 3.1 10E3/UL (ref 1.6–8.3)
NEUTROPHILS NFR BLD AUTO: 54 %
NRBC # BLD AUTO: 0 10E3/UL
NRBC BLD AUTO-RTO: 0 /100
NT-PROBNP SERPL-MCNC: 655 PG/ML (ref 0–1800)
P AXIS - MUSE: 74 DEGREES
PLATELET # BLD AUTO: 245 10E3/UL (ref 150–450)
POTASSIUM SERPL-SCNC: 3.7 MMOL/L (ref 3.4–5.3)
PR INTERVAL - MUSE: 126 MS
PROT SERPL-MCNC: 6.6 G/DL (ref 6.4–8.3)
QRS DURATION - MUSE: 88 MS
QT - MUSE: 380 MS
QTC - MUSE: 457 MS
R AXIS - MUSE: 48 DEGREES
RBC # BLD AUTO: 4.57 10E6/UL (ref 3.8–5.2)
SODIUM SERPL-SCNC: 140 MMOL/L (ref 135–145)
SYSTOLIC BLOOD PRESSURE - MUSE: NORMAL MMHG
T AXIS - MUSE: 67 DEGREES
TROPONIN T SERPL HS-MCNC: 10 NG/L
TSH SERPL DL<=0.005 MIU/L-ACNC: 3.64 UIU/ML (ref 0.3–4.2)
VENTRICULAR RATE- MUSE: 87 BPM
WBC # BLD AUTO: 5.7 10E3/UL (ref 4–11)

## 2025-03-21 PROCEDURE — 85025 COMPLETE CBC W/AUTO DIFF WBC: CPT | Performed by: EMERGENCY MEDICINE

## 2025-03-21 PROCEDURE — 96360 HYDRATION IV INFUSION INIT: CPT

## 2025-03-21 PROCEDURE — 84443 ASSAY THYROID STIM HORMONE: CPT | Performed by: EMERGENCY MEDICINE

## 2025-03-21 PROCEDURE — 83880 ASSAY OF NATRIURETIC PEPTIDE: CPT | Performed by: EMERGENCY MEDICINE

## 2025-03-21 PROCEDURE — 71046 X-RAY EXAM CHEST 2 VIEWS: CPT

## 2025-03-21 PROCEDURE — 84484 ASSAY OF TROPONIN QUANT: CPT | Performed by: EMERGENCY MEDICINE

## 2025-03-21 PROCEDURE — 83036 HEMOGLOBIN GLYCOSYLATED A1C: CPT

## 2025-03-21 PROCEDURE — 258N000003 HC RX IP 258 OP 636: Performed by: EMERGENCY MEDICINE

## 2025-03-21 PROCEDURE — 83735 ASSAY OF MAGNESIUM: CPT | Performed by: EMERGENCY MEDICINE

## 2025-03-21 PROCEDURE — 93005 ELECTROCARDIOGRAM TRACING: CPT

## 2025-03-21 PROCEDURE — 99285 EMERGENCY DEPT VISIT HI MDM: CPT | Mod: 25

## 2025-03-21 PROCEDURE — 80053 COMPREHEN METABOLIC PANEL: CPT | Performed by: EMERGENCY MEDICINE

## 2025-03-21 PROCEDURE — 36415 COLL VENOUS BLD VENIPUNCTURE: CPT | Performed by: EMERGENCY MEDICINE

## 2025-03-21 RX ADMIN — SODIUM CHLORIDE 500 ML: 9 INJECTION, SOLUTION INTRAVENOUS at 09:40

## 2025-03-21 ASSESSMENT — ACTIVITIES OF DAILY LIVING (ADL)
ADLS_ACUITY_SCORE: 41

## 2025-03-21 ASSESSMENT — COLUMBIA-SUICIDE SEVERITY RATING SCALE - C-SSRS
1. IN THE PAST MONTH, HAVE YOU WISHED YOU WERE DEAD OR WISHED YOU COULD GO TO SLEEP AND NOT WAKE UP?: NO
2. HAVE YOU ACTUALLY HAD ANY THOUGHTS OF KILLING YOURSELF IN THE PAST MONTH?: NO
6. HAVE YOU EVER DONE ANYTHING, STARTED TO DO ANYTHING, OR PREPARED TO DO ANYTHING TO END YOUR LIFE?: NO

## 2025-03-21 NOTE — DISCHARGE INSTRUCTIONS
You should continue to transition from sitting to standing to walking very slowly allowing your heart rate and blood pressure time to adjust.  You should drink plenty fluids to stay well-hydrated.  I did place a referral for cardiology, they should call you to help set you up in clinic as it may be time for another Holter monitor given your history however I would also follow-up in clinic with your primary doctor for recheck.  Certainly if you develop worsening symptoms including worsening dizziness, lightheadedness, chest pain, chest pressure heaviness or passout you should return here to the emergency department.

## 2025-03-21 NOTE — ED TRIAGE NOTES
Pt reports noticing palpitations that began at 0530 this morning. Pt denies hx of afib. No thinners.      Triage Assessment (Adult)       Row Name 03/21/25 0639          Triage Assessment    Airway WDL WDL        Respiratory WDL    Respiratory WDL WDL  denies sob and cough        Skin Circulation/Temperature WDL    Skin Circulation/Temperature WDL WDL        Cardiac WDL    Cardiac WDL X  palpitations and dizziness since 0530. Denies chest pain        Peripheral/Neurovascular WDL    Peripheral Neurovascular WDL WDL        Cognitive/Neuro/Behavioral WDL    Cognitive/Neuro/Behavioral WDL WDL

## 2025-03-21 NOTE — ED PROVIDER NOTES
"  Emergency Department Note      History of Present Illness     Chief Complaint   Palpitations      HPI   Nadiya Drummond is a 84 year old female who presents to the ED for palpitations. The patient states that she first had a dizzy spell yesterday when she went to stand up. She felt as though she would fall, so she sat down for a bit. She then got a heart rate monitor and noted her heart rate was fluctuating between the 50s to 110s. She tried drinking some water, and, within an hour, her heart rate returned to normal. Patient then woke this morning \"feeling strange\". She was not as dizzy, but had heart palpitations. She denies any chest pain/pressure or shortness of breath. Further denies a history of palpitations, though she states she has had dizzy spells before. She was put on a heart monitor a couple of years ago by her PCP, but had no significant findings. Patient has not had an recent cough, congestion, rhinorrhea, fever, nausea, vomiting, diarrhea, or other illness. No recent travel. No medication for hypertension.    Independent Historian   None    Review of External Notes   Holter monitor 5/24/2024 - 52960 PVCs noted with very few triplets. noted to have 1200 supraventricular ectopies, most of which were PACs the longest run of extra beats was 12 beats.  She did not press the button any time.  PVC burden was noted to be 16%.    Past Medical History     Medical History and Problem List   Onychomycosis   Hammer toe  Lipoma of skin and subcutaneous tissue  Localized osteoporosis  Chronic maxillary sinusitis  Cataract  Full dentures  Ganglion cyst of both feet  Inguinal hernia, left  Lactose intolerance  Temporomandibular joint disorder  Tobacco abuse    Medications   Zofran  Roxicodone    Surgical History   Past Surgical History:   Procedure Laterality Date    BLEPHAROPLASTY Bilateral 01/01/2002    BUNIONECTOMY Left 01/01/2007    CATARACT IOL, RT/LT Bilateral 01/01/2007    DAVINCI XI HERNIORRHAPHY INGUINAL Left " No restrictions. Ensure delivered with breakfast & dinner   "12/12/2024    Procedure: Robotic assisted bilateral inguinal hernia repair with mesh and ventral hernia repair;  Surgeon: Gerry Freeman MD;  Location:  OR    ORTHOPEDIC SURGERY      REPAIR HAMMER TOE Bilateral 4/24/2024    Procedure: 1.  HAMMERTOE REPAIR RIGHT 2ND TOE; 2.  BILATERAL BONE SPUR RESECTION;  Surgeon: Justin Sanchez DPM;  Location:  OR       Physical Exam     Patient Vitals for the past 24 hrs:   BP Temp Temp src Pulse Resp SpO2 Height Weight   03/21/25 1140 (!) 193/81 -- -- 65 10 100 % -- --   03/21/25 1130 (!) 162/109 -- -- 59 12 -- -- --   03/21/25 1115 -- -- -- 72 18 -- -- --   03/21/25 1100 (!) 155/115 -- -- 58 19 -- -- --   03/21/25 1029 (!) 191/87 -- -- 87 11 -- -- --   03/21/25 0959 (!) 175/78 -- -- 58 16 -- -- --   03/21/25 0949 -- -- -- 59 19 -- -- --   03/21/25 0939 (!) 186/82 -- -- 61 11 -- -- --   03/21/25 0925 (!) 162/75 -- -- 61 21 -- -- --   03/21/25 0855 (!) 205/85 -- -- 81 -- -- -- --   03/21/25 0815 (!) 155/66 -- -- 68 22 -- -- --   03/21/25 0800 (!) 190/70 -- -- 69 24 -- -- --   03/21/25 0745 (!) 196/85 -- -- 68 19 -- -- --   03/21/25 0704 -- -- -- -- 18 -- -- --   03/21/25 0659 (!) 184/79 -- -- 70 -- -- -- --   03/21/25 0654 (!) 203/77 -- -- 68 20 -- -- --   03/21/25 0651 (!) 203/77 -- -- 70 14 -- -- --   03/21/25 0649 (!) 199/78 -- -- 70 20 -- -- --   03/21/25 0641 (!) 217/75 99.1  F (37.3  C) Temporal 80 16 100 % 1.626 m (5' 4\") 61.2 kg (135 lb)     Physical Exam  Constitutional: Alert, attentive, GCS 15  HENT:    Nose: Nose normal.    Mouth/Throat: Oropharynx is clear, mucous membranes are   Eyes: EOM are normal, anicteric, conjugate gaze  CV: regular rate and rhythm   Chest: Effort normal and breath sounds clear without wheezing or rales, symmetric bilaterally   GI:  non tender. No distension. No guarding or rebound.    MSK: No LE edema, no tenderness to palpation of BLE.  Neurological: Alert, attentive, moving all extremities equally.   Skin: Skin is warm and " dry.      Diagnostics     Lab Results   Labs Ordered and Resulted from Time of ED Arrival to Time of ED Departure   COMPREHENSIVE METABOLIC PANEL - Abnormal       Result Value    Sodium 140      Potassium 3.7      Carbon Dioxide (CO2) 23      Anion Gap 11      Urea Nitrogen 8.6      Creatinine 0.66      GFR Estimate 87      Calcium 9.2      Chloride 106      Glucose 106 (*)     Alkaline Phosphatase 83      AST 27      ALT 13      Protein Total 6.6      Albumin 4.2      Bilirubin Total 0.6     TROPONIN T, HIGH SENSITIVITY - Normal    Troponin T, High Sensitivity 10     NT PROBNP INPATIENT - Normal    N terminal Pro BNP Inpatient 655     MAGNESIUM - Normal    Magnesium 2.0     TSH WITH FREE T4 REFLEX - Normal    TSH 3.64     CBC WITH PLATELETS AND DIFFERENTIAL    WBC Count 5.7      RBC Count 4.57      Hemoglobin 13.4      Hematocrit 40.0      MCV 88      MCH 29.3      MCHC 33.5      RDW 12.3      Platelet Count 245      % Neutrophils 54      % Lymphocytes 27      % Monocytes 10      % Eosinophils 8      % Basophils 1      % Immature Granulocytes 0      NRBCs per 100 WBC 0      Absolute Neutrophils 3.1      Absolute Lymphocytes 1.5      Absolute Monocytes 0.6      Absolute Eosinophils 0.4      Absolute Basophils 0.1      Absolute Immature Granulocytes 0.0      Absolute NRBCs 0.0         Imaging   Chest XR,  PA & LAT   Final Result   IMPRESSION: Borderline heart size. Normal pulmonary vasculature. Hyperinflation. Biapical scarring. No consolidative opacities. No pleural effusion or pneumothorax.          EKG   ECG taken at 0639, ECG read at 0646  Normal sinus rhythm  Nonspecific ST abnormality  Abnormal ECG   PVC and PAC resolved as compared to prior, dated 4/24/24.  Rate 87 bpm. MS interval 126 ms. QRS duration 88 ms. QT/QTc 380/457 ms. P-R-T axes 74 48 67.    Independent Interpretation   CXR: No pneumothorax or pleural effusion.    ED Course      Medications Administered   Medications   sodium chloride 0.9% BOLUS 500  mL (0 mLs Intravenous Stopped 3/21/25 1031)       Procedures   Procedures     Discussion of Management   None    ED Course   ED Course as of 03/21/25 1348   Fri Mar 21, 2025   0716 I obtained history and examined the patient as noted above.     1123 I rechecked the patient for discharge.       Additional Documentation  None    Medical Decision Making / Diagnosis     CMS Diagnoses: None    MIPS       None    MDM   Nadiya Drummond is a 84 year old female without significant past medical history presenting for evaluation of palpitations and what sounds like orthostatic lightheadedness.  She was noted to be hypertensive on arrival, does not have a history of the same and is not on medications.  EKG here showed normal sinus rhythm, however comparing it to previous EKG she had a significant PVC and PAC burden in fact she had a Holter monitor just under a year ago which showed a 16% PVC burden with 16,000 PVCs in a 24-hour monitor without any symptom triggered events as well as 12,000 PACs.  Screening labs here are unremarkable, she denies any pain pressure or heaviness with this, low suspicion for aortic pathology.  Chest x-ray is clear.  Orthostatics were notable for HR increase of 20, BP remained stable.  She was given 500 cc IV fluid bolus, did feel improved with ambulating.  Her blood pressure here was noted to be elevated, I did review with her that this does not to be monitored though I will hold off on empirically starting her on medication due to her orthostasis.  Return precautions were reviewed, I recommended close PCP and cardiology follow-up she may be due for repeat Holter.  She expressed understanding this plan and was discharged.    Disposition   The patient was discharged.     Diagnosis     ICD-10-CM    1. Orthostasis  I95.1       2. Palpitations  R00.2 Adult Cardiology Eval  Referral      3. Frequent PVCs  I49.3 Adult Cardiology Eval  Referral      4. Elevated blood pressure reading with  diagnosis of hypertension  I10            Jesus Vergara MD  [employer]  1:47 PM 03/21/25           Scribe Disclosure:  I, Kailyninga Alvarez, am serving as a scribe at 7:11 AM on 3/21/2025 to document services personally performed by Jesus Vergara MD based on my observations and the provider's statements to me.        Jesus Vergara MD  03/21/25 8379

## 2025-03-24 ENCOUNTER — OFFICE VISIT (OUTPATIENT)
Dept: FAMILY MEDICINE | Facility: CLINIC | Age: 85
End: 2025-03-24
Payer: COMMERCIAL

## 2025-03-24 VITALS
SYSTOLIC BLOOD PRESSURE: 164 MMHG | BODY MASS INDEX: 22.91 KG/M2 | TEMPERATURE: 97.2 F | OXYGEN SATURATION: 99 % | WEIGHT: 134.2 LBS | HEART RATE: 81 BPM | DIASTOLIC BLOOD PRESSURE: 84 MMHG | HEIGHT: 64 IN | RESPIRATION RATE: 14 BRPM

## 2025-03-24 DIAGNOSIS — D22.9 ATYPICAL MOLE: ICD-10-CM

## 2025-03-24 DIAGNOSIS — I95.1 ORTHOSTATIC HYPOTENSION: Primary | ICD-10-CM

## 2025-03-24 DIAGNOSIS — I49.3 PVC'S (PREMATURE VENTRICULAR CONTRACTIONS): ICD-10-CM

## 2025-03-24 DIAGNOSIS — R73.09 ELEVATED GLUCOSE: ICD-10-CM

## 2025-03-24 LAB
EST. AVERAGE GLUCOSE BLD GHB EST-MCNC: 105 MG/DL
HBA1C MFR BLD: 5.3 %

## 2025-03-24 PROCEDURE — 93242 EXT ECG>48HR<7D RECORDING: CPT | Performed by: PHYSICIAN ASSISTANT

## 2025-03-24 PROCEDURE — 99214 OFFICE O/P EST MOD 30 MIN: CPT | Performed by: PHYSICIAN ASSISTANT

## 2025-03-24 PROCEDURE — 3079F DIAST BP 80-89 MM HG: CPT | Performed by: PHYSICIAN ASSISTANT

## 2025-03-24 PROCEDURE — 3077F SYST BP >= 140 MM HG: CPT | Performed by: PHYSICIAN ASSISTANT

## 2025-03-24 PROCEDURE — G2211 COMPLEX E/M VISIT ADD ON: HCPCS | Performed by: PHYSICIAN ASSISTANT

## 2025-03-24 PROCEDURE — 1126F AMNT PAIN NOTED NONE PRSNT: CPT | Performed by: PHYSICIAN ASSISTANT

## 2025-03-24 RX ORDER — VERAPAMIL HYDROCHLORIDE 120 MG/1
120 TABLET, FILM COATED, EXTENDED RELEASE ORAL AT BEDTIME
Qty: 30 TABLET | Refills: 5 | Status: CANCELLED | OUTPATIENT
Start: 2025-03-24

## 2025-03-24 RX ORDER — METOPROLOL SUCCINATE 50 MG/1
50 TABLET, EXTENDED RELEASE ORAL DAILY
Qty: 30 TABLET | Refills: 1 | Status: SHIPPED | OUTPATIENT
Start: 2025-03-24

## 2025-03-24 ASSESSMENT — PAIN SCALES - GENERAL: PAINLEVEL_OUTOF10: NO PAIN (0)

## 2025-03-24 NOTE — PROGRESS NOTES
Assessment & Plan     (I95.1) Orthostatic hypotension  (primary encounter diagnosis)  Comment:   Plan: Hemoglobin A1c          improving    (I49.3) PVC's (premature ventricular contractions)  Comment:   Plan: ZIO PATCH 3-7 DAYS (additional cost to         patient), ZIO PATCH 3-7 DAYS APPLICATION,         metoprolol succinate ER (TOPROL XL) 50 MG 24 hr        tablet          Zio patch applied today    Please call cardiology if you don't hear from them today    Start new medication (metoprolol) once daily for blood pressure and palpatations    Check your blood pressure daily, 2x - purchase a BP cuff at the pharmacy today    A1c screening test for diabetes was added on to blood work done on 3/21/25    Dermatology referral sent for spot on your right cheek    (D22.9) Atypical mole  Comment:   Plan: Adult Dermatology  Referral            (R73.09) Elevated glucose  Comment:   Plan: Hemoglobin A1c            MED REC REQUIRED  Post Medication Reconciliation Status:  Patient was not discharged from an inpatient facility or TCU        Subjective   Nadiya is a 84 year old, presenting for the following health issues:  Hospital F/U (Pt stated that she wants her A1c checked, diabetes runs in the family. Her sisters is diagnosis with Type II Diabetes.  )        3/24/2025     7:29 AM   Additional Questions   Roomed by Marcela Mittal   Accompanied by Self     HPI        ED/UC Followup:    Facility:  [facility] Emergency [facility]   Date of visit: 3/21/2025 (5 hours)   Reason for visit: Palpitations   Current Status: Pt stated that she is feeling a little dizzy still but not as much since before going into ED, Pt is concern that she might have diabetes. When pt gets up from sitting or laying down she feels a little dizzy.     ER visit 3/21/25:    [facility]   Nadiya Drummond is a 84 year old female without significant past medical history presenting for evaluation of palpitations and what sounds like orthostatic  "lightheadedness.  She was noted to be hypertensive on arrival, does not have a history of the same and is not on medications.  EKG here showed normal sinus rhythm, however comparing it to previous EKG she had a significant PVC and PAC burden in fact she had a Holter monitor just under a year ago which showed a 16% PVC burden with 16,000 PVCs in a 24-hour monitor without any symptom triggered events as well as 12,000 PACs.  Screening labs here are unremarkable, she denies any pain pressure or heaviness with this, low suspicion for aortic pathology.  Chest x-ray is clear.  Orthostatics were notable for HR increase of 20, BP remained stable.  She was given 500 cc IV fluid bolus, did feel improved with ambulating.  Her blood pressure here was noted to be elevated, I did review with her that this does not to be monitored though I will hold off on empirically starting her on medication due to her orthostasis.  Return precautions were reviewed, I recommended close PCP and cardiology follow-up she may be due for repeat Holter.  She expressed understanding this plan and was discharged.       Today:  ER follow up: feeling dizzy off and on, Thursday stood up from chair and felt like floor was coming up toward her/room was spinning, sat back down until she recovered, Friday AM woke up very dizzy and feeling like she might pass out, checked HR and it varied from  so went to ER, feeling back to baseline now, still dizzy off and on, no N/V with dizziness  Endorses ringing in the ears, no significant history of vertigo  Diet: doesn't eat very well, drinks more coffee than water, rarely eats until lunchtime, plans to increase water intake  Both of her sisters have type 2 DM, so would like A1c checked        Objective    BP (!) 164/84 (BP Location: Left arm, Patient Position: Sitting, Cuff Size: Adult Regular)   Pulse 81   Temp 97.2  F (36.2  C) (Temporal)   Resp 14   Ht 1.626 m (5' 4\")   Wt 60.9 kg (134 lb 3.2 oz)   SpO2 " 99%   BMI 23.04 kg/m    Body mass index is 23.04 kg/m .  Physical Exam  Neck:      Vascular: No carotid bruit.   Cardiovascular:      Rate and Rhythm: Normal rate and regular rhythm.      Heart sounds: Normal heart sounds.   Pulmonary:      Effort: Pulmonary effort is normal.      Breath sounds: Normal breath sounds.                    Signed Electronically by: Peter Marie PA-C

## 2025-03-24 NOTE — PROGRESS NOTES
Nadiya Drummond arrived here on 3/24/2025 1:25 PM for 3-7 Days  Zio monitor placement per ordering provider Peter for the diagnosis   PVC's (premature ventricular contractions) [I49.3]      .  Patient s skin was prepped per protocol. Dr. Frank GRACE is the supervising MD.  Zio monitor was placed.  Instructions were reviewed with and given to the patient.  Patient verbalized understanding of wear, troubleshooting and monitor return instructions.

## 2025-03-24 NOTE — PATIENT INSTRUCTIONS
Zio patch applied today    Please call cardiology if you don't hear from them today    Start new medication (metoprolol) once daily for blood pressure and palpatations    Check your blood pressure daily, 2x    A1c screening test for diabetes was added on to blood work done on 3/21/25    Dermatology referral sent for spot on your right cheek

## 2025-04-08 LAB — CV ZIO PRELIM RESULTS: NORMAL

## 2025-04-12 LAB — CV ZIO PRELIM RESULTS: NORMAL

## 2025-04-30 NOTE — PROGRESS NOTES
HISTORY OF PRESENT ILLNESS:    This is a 84 year old female who follows with Dr Marcano at [facility]  Her past medical history includes:  PVCs, hypertension    Ms Drummond was seen in clinic last year after PVCs noted on EKG at primary clinic  She denied any significant palpitations  ECHO showed LVEF 55-60%, grade II diastolic dysfunction, normal RV function, mod-severe left atrial enlargement, 1+ pulmonic valvular regurgitation  Holter (5/2024) showed Sinus rhythm with average HR 71 bpm  PVC burden 16%, mainly isolated, PACs and brief SVT  Continued observance was planned due to her being asymptomatic    She was seen in the ED (3/21/25) for palpitations and positional dizziness  She was noted to be quite hypertensive  Labs and CXR were benign  EKG showed sinus rhythm at 87 bpm and nonspecific ST abnormality  She was given some IVFs due to mildly positive orthostatics  A repeat Ziopatch monitor was arranged and she was started on Metoprolol by her primary provider    ZioPatch monitor (4/8/25) showed Sinus rhythm with average HR 71 bpm, 113 brief runs of SVT, <1% PVC and PAC burden  Her symptoms did correspond with the PAC/PVCs    Our visit today is for further review    Ms Drummond notes less palpitations since she was started on Metoprolol  However, she admits to 2 episodes in the past month where she felt a racing heart beat  She sat down and they both abated after a couple of minutes  One episode was when she first woke up and the other occurred in the evening time  She admits to drinking many cups of caffeinated coffee  every day and not enough water   She also admits to being under quite a bit of stress having lost a few close family members recently  She tries to walk daily and denies any chest pain or significant shortness of breath  She occasionally will get some ankle swelling that improves by morning  Varicosities are noted in her legs and I encouraged support socks.  I also encouraged reducing  "caffeine and increasing her water intake      BP (!) 160/70 (BP Location: Left arm, Patient Position: Sitting, Cuff Size: Adult Regular)   Pulse 78   Ht 1.626 m (5' 4\")   Wt 63 kg (138 lb 12.8 oz)   SpO2 98%   BMI 23.82 kg/m       IMPRESSION AND PLAN:    Frequent PVCs  Brief SVT  -improved symptoms and less PVCs on Metoprolol  -will increase Metoprolol by taking 1 tab in am and 1/2 tab in pm  -encouraged reduction of caffeine and increasing water intake  -return in 4-6 wks  She will call with further palpitations and would consider repeating monitor      Hypertension:  -on Metoprolol XR 50 mg  -BP elevated  Will increase Metoprolol as noted above    Right Carotid Bruit  -will arrange carotid ultrasound    The total time for the visit today was 33 minutes which includes patient visit, reviewing of records, discussion, and placing of orders of the outpatient coordination of cardiovascular care as described.  The level of medical decision making during this visit was of moderate complexity.  Thank you for allowing me to participate in their care.    The longitudinal plan of care for the diagnosis(es)/condition(s) as documented were addressed during this visit. Due to the added complexity in care, I will continue to support Nadiya in the subsequent management and with ongoing continuity of care.            Orders Placed This Encounter   Procedures    US Carotid Bilateral    Follow-Up with Cardiology       Orders Placed This Encounter   Medications    metoprolol succinate ER (TOPROL XL) 50 MG 24 hr tablet     Sig: Take 1.5 tablets (75 mg) by mouth daily.     Dispense:  135 tablet     Refill:  3       Medications Discontinued During This Encounter   Medication Reason    metoprolol succinate ER (TOPROL XL) 50 MG 24 hr tablet          Encounter Diagnoses   Name Primary?    Palpitations     Frequent PVCs     PVC's (premature ventricular contractions)     Bruit of right carotid artery Yes       CURRENT " MEDICATIONS:  Current Outpatient Medications   Medication Sig Dispense Refill    CALCIUM-VITAMIN D PO       metoprolol succinate ER (TOPROL XL) 50 MG 24 hr tablet Take 1.5 tablets (75 mg) by mouth daily. 135 tablet 3    Multiple Vitamins-Minerals (OCUVITE PO) Take by mouth daily.         ALLERGIES     Allergies   Allergen Reactions    Alendronate Muscle Pain (Myalgia)       PAST MEDICAL HISTORY:  Past Medical History:   Diagnosis Date    Lactose intolerance     TMJ (temporomandibular joint disorder)     Tobacco abuse        PAST SURGICAL HISTORY:  Past Surgical History:   Procedure Laterality Date    BLEPHAROPLASTY Bilateral 2002    BUNIONECTOMY Left 2007    CATARACT IOL, RT/LT Bilateral 2007    DAVINCI XI HERNIORRHAPHY INGUINAL Left 2024    Procedure: Robotic assisted bilateral inguinal hernia repair with mesh and ventral hernia repair;  Surgeon: Gerry Freeman MD;  Location:  OR    ORTHOPEDIC SURGERY      REPAIR HAMMER TOE Bilateral 2024    Procedure: 1.  HAMMERTOE REPAIR RIGHT 2ND TOE; 2.  BILATERAL BONE SPUR RESECTION;  Surgeon: Justin Sanchez DPM;  Location:  OR       FAMILY HISTORY:  Family History   Problem Relation Age of Onset    Heart Disease Mother     Heart Disease Father     LUNG DISEASE Sister         unclear, asthma like    Diabetes Type 2  Sister         resolved after colon cancer surgery    Alcohol/Drug Brother         lived in [facility]    Diabetes Type 1 Daughter     Allergies Daughter     Diabetes Type 1 Grandchild     Colon Cancer Sister        SOCIAL HISTORY:  Social History     Socioeconomic History    Marital status: Single     Spouse name: None    Number of children: 3    Years of education: None    Highest education level: None   Tobacco Use    Smoking status: Former     Current packs/day: 0.00     Types: Cigarettes     Start date: 1965     Quit date: 2022     Years since quittin.9     Passive exposure: Past    Smokeless tobacco: Never     Tobacco comments:     no comment   Vaping Use    Vaping status: Never Used   Substance and Sexual Activity    Alcohol use: No    Drug use: No    Sexual activity: Not Currently     Partners: Male     Birth control/protection: None   Other Topics Concern     Service No    Blood Transfusions No    Exercise No    Seat Belt Yes    Self-Exams No    Parent/sibling w/ CABG, MI or angioplasty before 65F 55M? No   Social History Narrative    07-    Balanced Diet - Yes    Osteoporosis Prevention Measures - Dairy servings per day: 1 and Medication/Supplements (See current meds)    Regular Exercise -  No Describe     Dental Exam - has false teeth,got them  about 9 months ago    Eye Exam - 2006    Self Breast Exam - No    Abuse: Current or Past (Physical, Sexual or Emotional)- No    Do you feel safe in your environment - Yes    Guns stored in the home - Yes    Sunscreen used - No    Seatbelts used - Yes    Lipids -      Glucose -      Colon Cancer Screening - Flexible Sigmoidoscopy about 5 years ago, at the [facility](date completed)    Hemoccults - NO    Pap Test -  About 3 years ago    Do you have any concerns about STD's -  No    Mammography - about 3 years ago    DEXA - about 3 years ago    Immunizations reviewed and up to date - pt. Thinks she had a TD, shot a year ago             Social Drivers of Health     Financial Resource Strain: Low Risk  (1/15/2024)    Financial Resource Strain     Within the past 12 months, have you or your family members you live with been unable to get utilities (heat, electricity) when it was really needed?: No   Food Insecurity: Low Risk  (1/15/2024)    Food Insecurity     Within the past 12 months, did you worry that your food would run out before you got money to buy more?: No     Within the past 12 months, did the food you bought just not last and you didn t have money to get more?: No   Transportation Needs: Low Risk  (1/15/2024)    Transportation Needs     " Within the past 12 months, has lack of transportation kept you from medical appointments, getting your medicines, non-medical meetings or appointments, work, or from getting things that you need?: No   Interpersonal Safety: Low Risk  (12/12/2024)    Interpersonal Safety     Do you feel physically and emotionally safe where you currently live?: Yes     Within the past 12 months, have you been hit, slapped, kicked or otherwise physically hurt by someone?: No     Within the past 12 months, have you been humiliated or emotionally abused in other ways by your partner or ex-partner?: No   Housing Stability: Low Risk  (1/15/2024)    Housing Stability     Do you have housing? : Yes     Are you worried about losing your housing?: No       Review of Systems:  Skin:          Eyes:         ENT:         Respiratory:          Cardiovascular:         Gastroenterology:        Genitourinary:         Musculoskeletal:         Neurologic:         Psychiatric:         Heme/Lymph/Imm:         Endocrine:           Physical Exam:  Vitals: BP (!) 160/70 (BP Location: Left arm, Patient Position: Sitting, Cuff Size: Adult Regular)   Pulse 78   Ht 1.626 m (5' 4\")   Wt 63 kg (138 lb 12.8 oz)   SpO2 98%   BMI 23.82 kg/m      Constitutional:  cooperative thin      Skin:  warm and dry to the touch          Head:           Eyes:  pupils equal and round        Lymph:      ENT:  no pallor or cyanosis        Neck:  JVP normal right carotid bruit      Respiratory:  clear to auscultation, normal respiratory excursion         Cardiac: regular rhythm     no presence of murmur          pulses full and equal                                        GI:           Extremities and Muscular Skeletal:  no edema   varicose vein          Neurological:  affect appropriate        Psych:  Alert and Oriented x 3          CC  Jesus Vergara MD  [facility]  [address]  [place] [address]                    "

## 2025-05-06 ENCOUNTER — OFFICE VISIT (OUTPATIENT)
Dept: CARDIOLOGY | Facility: CLINIC | Age: 85
End: 2025-05-06
Attending: EMERGENCY MEDICINE
Payer: COMMERCIAL

## 2025-05-06 ENCOUNTER — HOSPITAL ENCOUNTER (OUTPATIENT)
Dept: CARDIOLOGY | Facility: CLINIC | Age: 85
Discharge: HOME | End: 2025-05-06
Attending: NURSE PRACTITIONER
Payer: COMMERCIAL

## 2025-05-06 VITALS
OXYGEN SATURATION: 98 % | SYSTOLIC BLOOD PRESSURE: 160 MMHG | HEART RATE: 78 BPM | HEIGHT: 64 IN | DIASTOLIC BLOOD PRESSURE: 70 MMHG | BODY MASS INDEX: 23.7 KG/M2 | WEIGHT: 138.8 LBS

## 2025-05-06 DIAGNOSIS — R00.2 PALPITATIONS: ICD-10-CM

## 2025-05-06 DIAGNOSIS — R09.89 BRUIT OF RIGHT CAROTID ARTERY: ICD-10-CM

## 2025-05-06 DIAGNOSIS — I49.3 PVC'S (PREMATURE VENTRICULAR CONTRACTIONS): ICD-10-CM

## 2025-05-06 DIAGNOSIS — I49.3 FREQUENT PVCS: ICD-10-CM

## 2025-05-06 DIAGNOSIS — R09.89 BRUIT OF RIGHT CAROTID ARTERY: Primary | ICD-10-CM

## 2025-05-06 PROCEDURE — 3077F SYST BP >= 140 MM HG: CPT | Performed by: NURSE PRACTITIONER

## 2025-05-06 PROCEDURE — 93880 EXTRACRANIAL BILAT STUDY: CPT

## 2025-05-06 PROCEDURE — 99214 OFFICE O/P EST MOD 30 MIN: CPT | Mod: 25 | Performed by: NURSE PRACTITIONER

## 2025-05-06 PROCEDURE — 3078F DIAST BP <80 MM HG: CPT | Performed by: NURSE PRACTITIONER

## 2025-05-06 RX ORDER — METOPROLOL SUCCINATE 50 MG/1
75 TABLET, EXTENDED RELEASE ORAL DAILY
Qty: 135 TABLET | Refills: 3 | Status: SHIPPED | OUTPATIENT
Start: 2025-05-06

## 2025-05-06 NOTE — PATIENT INSTRUCTIONS
Increase your Metoprolol XR to 75 mg/day  ( 1 tab in am and 1/2 tab in evening)  Arrange carotid ultrasound  Lower your caffeine intake and increase your water  Return in 4-6 wks    It was a pleasure seeing you today     Please do not hesitate to call my nurse team with any questions or concerns:  [phone]    Scheduling number:  [phone]    DARWIN Marquez, CNP

## 2025-05-06 NOTE — LETTER
5/6/2025    Carmen Beckett MD  [address]  [place] [address]    RE: Nadiya LAI Bhanu       Dear Colleague,     I had the pleasure of seeing Nadiya Drummond in the [facility].  HISTORY OF PRESENT ILLNESS:    This is a 84 year old female who follows with Dr Marcano at [facility]  Her past medical history includes:  PVCs, hypertension    Ms Drummond was seen in clinic last year after PVCs noted on EKG at primary clinic  She denied any significant palpitations  ECHO showed LVEF 55-60%, grade II diastolic dysfunction, normal RV function, mod-severe left atrial enlargement, 1+ pulmonic valvular regurgitation  Holter (5/2024) showed Sinus rhythm with average HR 71 bpm  PVC burden 16%, mainly isolated, PACs and brief SVT  Continued observance was planned due to her being asymptomatic    She was seen in the ED (3/21/25) for palpitations and positional dizziness  She was noted to be quite hypertensive  Labs and CXR were benign  EKG showed sinus rhythm at 87 bpm and nonspecific ST abnormality  She was given some IVFs due to mildly positive orthostatics  A repeat Ziopatch monitor was arranged and she was started on Metoprolol by her primary provider    ZioPatch monitor (4/8/25) showed Sinus rhythm with average HR 71 bpm, 113 brief runs of SVT, <1% PVC and PAC burden  Her symptoms did correspond with the PAC/PVCs    Our visit today is for further review    Ms Drummond notes less palpitations since she was started on Metoprolol  However, she admits to 2 episodes in the past month where she felt a racing heart beat  She sat down and they both abated after a couple of minutes  One episode was when she first woke up and the other occurred in the evening time  She admits to drinking many cups of caffeinated coffee  every day and not enough water   She also admits to being under quite a bit of stress having lost a few close family members recently  She tries to walk daily and denies any  "chest pain or significant shortness of breath  She occasionally will get some ankle swelling that improves by morning  Varicosities are noted in her legs and I encouraged support socks.  I also encouraged reducing caffeine and increasing her water intake      BP (!) 160/70 (BP Location: Left arm, Patient Position: Sitting, Cuff Size: Adult Regular)   Pulse 78   Ht 1.626 m (5' 4\")   Wt 63 kg (138 lb 12.8 oz)   SpO2 98%   BMI 23.82 kg/m       IMPRESSION AND PLAN:    Frequent PVCs  Brief SVT  -improved symptoms and less PVCs on Metoprolol  -will increase Metoprolol by taking 1 tab in am and 1/2 tab in pm  -encouraged reduction of caffeine and increasing water intake  -return in 4-6 wks  She will call with further palpitations and would consider repeating monitor      Hypertension:  -on Metoprolol XR 50 mg  -BP elevated  Will increase Metoprolol as noted above    Right Carotid Bruit  -will arrange carotid ultrasound    The total time for the visit today was 33 minutes which includes patient visit, reviewing of records, discussion, and placing of orders of the outpatient coordination of cardiovascular care as described.  The level of medical decision making during this visit was of moderate complexity.  Thank you for allowing me to participate in their care.    The longitudinal plan of care for the diagnosis(es)/condition(s) as documented were addressed during this visit. Due to the added complexity in care, I will continue to support Nadiya in the subsequent management and with ongoing continuity of care.            Orders Placed This Encounter   Procedures     US Carotid Bilateral     Follow-Up with Cardiology       Orders Placed This Encounter   Medications     metoprolol succinate ER (TOPROL XL) 50 MG 24 hr tablet     Sig: Take 1.5 tablets (75 mg) by mouth daily.     Dispense:  135 tablet     Refill:  3       Medications Discontinued During This Encounter   Medication Reason     metoprolol succinate ER (TOPROL " XL) 50 MG 24 hr tablet          Encounter Diagnoses   Name Primary?     Palpitations      Frequent PVCs      PVC's (premature ventricular contractions)      Bruit of right carotid artery Yes       CURRENT MEDICATIONS:  Current Outpatient Medications   Medication Sig Dispense Refill     CALCIUM-VITAMIN D PO        metoprolol succinate ER (TOPROL XL) 50 MG 24 hr tablet Take 1.5 tablets (75 mg) by mouth daily. 135 tablet 3     Multiple Vitamins-Minerals (OCUVITE PO) Take by mouth daily.         ALLERGIES     Allergies   Allergen Reactions     Alendronate Muscle Pain (Myalgia)       PAST MEDICAL HISTORY:  Past Medical History:   Diagnosis Date     Lactose intolerance      TMJ (temporomandibular joint disorder)      Tobacco abuse        PAST SURGICAL HISTORY:  Past Surgical History:   Procedure Laterality Date     BLEPHAROPLASTY Bilateral 01/01/2002     BUNIONECTOMY Left 01/01/2007     CATARACT IOL, RT/LT Bilateral 01/01/2007     DAVINCI XI HERNIORRHAPHY INGUINAL Left 12/12/2024    Procedure: Robotic assisted bilateral inguinal hernia repair with mesh and ventral hernia repair;  Surgeon: Gerry Freeman MD;  Location: [facility] OR     ORTHOPEDIC SURGERY       REPAIR HAMMER TOE Bilateral 4/24/2024    Procedure: 1.  HAMMERTOE REPAIR RIGHT 2ND TOE; 2.  BILATERAL BONE SPUR RESECTION;  Surgeon: Justin Sanchez DPM;  Location:  OR       FAMILY HISTORY:  Family History   Problem Relation Age of Onset     Heart Disease Mother      Heart Disease Father      LUNG DISEASE Sister         unclear, asthma like     Diabetes Type 2  Sister         resolved after colon cancer surgery     Alcohol/Drug Brother         lived in [facility]     Diabetes Type 1 Daughter      Allergies Daughter      Diabetes Type 1 Grandchild      Colon Cancer Sister        SOCIAL HISTORY:  Social History     Socioeconomic History     Marital status: Single     Spouse name: None     Number of children: 3     Years of education: None     Highest education level:  None   Tobacco Use     Smoking status: Former     Current packs/day: 0.00     Types: Cigarettes     Start date: 1965     Quit date: 2022     Years since quittin.9     Passive exposure: Past     Smokeless tobacco: Never     Tobacco comments:     no comment   Vaping Use     Vaping status: Never Used   Substance and Sexual Activity     Alcohol use: No     Drug use: No     Sexual activity: Not Currently     Partners: Male     Birth control/protection: None   Other Topics Concern      Service No     Blood Transfusions No     Exercise No     Seat Belt Yes     Self-Exams No     Parent/sibling w/ CABG, MI or angioplasty before 65F 55M? No   Social History Narrative    07-    Balanced Diet - Yes    Osteoporosis Prevention Measures - Dairy servings per day: 1 and Medication/Supplements (See current meds)    Regular Exercise -  No Describe     Dental Exam - has false teeth,got them  about 9 months ago    Eye Exam -     Self Breast Exam - No    Abuse: Current or Past (Physical, Sexual or Emotional)- No    Do you feel safe in your environment - Yes    Guns stored in the home - Yes    Sunscreen used - No    Seatbelts used - Yes    Lipids -      Glucose -      Colon Cancer Screening - Flexible Sigmoidoscopy about 5 years ago, at the [facility](date completed)    Hemoccults - NO    Pap Test -  About 3 years ago    Do you have any concerns about STD's -  No    Mammography - about 3 years ago    DEXA - about 3 years ago    Immunizations reviewed and up to date - pt. Thinks she had a TD, shot a year ago             Social Drivers of Health     Financial Resource Strain: Low Risk  (1/15/2024)    Financial Resource Strain      Within the past 12 months, have you or your family members you live with been unable to get utilities (heat, electricity) when it was really needed?: No   Food Insecurity: Low Risk  (1/15/2024)    Food Insecurity      Within the past 12 months, did you worry that  "your food would run out before you got money to buy more?: No      Within the past 12 months, did the food you bought just not last and you didn t have money to get more?: No   Transportation Needs: Low Risk  (1/15/2024)    Transportation Needs      Within the past 12 months, has lack of transportation kept you from medical appointments, getting your medicines, non-medical meetings or appointments, work, or from getting things that you need?: No   Interpersonal Safety: Low Risk  (12/12/2024)    Interpersonal Safety      Do you feel physically and emotionally safe where you currently live?: Yes      Within the past 12 months, have you been hit, slapped, kicked or otherwise physically hurt by someone?: No      Within the past 12 months, have you been humiliated or emotionally abused in other ways by your partner or ex-partner?: No   Housing Stability: Low Risk  (1/15/2024)    Housing Stability      Do you have housing? : Yes      Are you worried about losing your housing?: No       Review of Systems:  Skin:          Eyes:         ENT:         Respiratory:          Cardiovascular:         Gastroenterology:        Genitourinary:         Musculoskeletal:         Neurologic:         Psychiatric:         Heme/Lymph/Imm:         Endocrine:           Physical Exam:  Vitals: BP (!) 160/70 (BP Location: Left arm, Patient Position: Sitting, Cuff Size: Adult Regular)   Pulse 78   Ht 1.626 m (5' 4\")   Wt 63 kg (138 lb 12.8 oz)   SpO2 98%   BMI 23.82 kg/m      Constitutional:  cooperative thin      Skin:  warm and dry to the touch          Head:           Eyes:  pupils equal and round        Lymph:      ENT:  no pallor or cyanosis        Neck:  JVP normal right carotid bruit      Respiratory:  clear to auscultation, normal respiratory excursion         Cardiac: regular rhythm     no presence of murmur          pulses full and equal                                        GI:           Extremities and Muscular Skeletal:  no " edema   varicose vein          Neurological:  affect appropriate        Psych:  Alert and Oriented x 3          CC  Jesus Vergara MD  [facility]  [address]  [place] [address]                      Thank you for allowing me to participate in the care of your patient.      Sincerely,     DARWIN Michaels [facility]  cc:   Jesus Vergara MD  [facility]  [address]  [place] [address]

## 2025-05-07 ENCOUNTER — RESULTS FOLLOW-UP (OUTPATIENT)
Dept: CARDIOLOGY | Facility: CLINIC | Age: 85
End: 2025-05-07

## 2025-05-07 NOTE — RESULT ENCOUNTER NOTE
Results and recommendations routed Via My Chart with call back information if needed.   Result Note  < 50% bilateral internal carotid artery stenosis by velocity criteria

## 2025-05-15 ENCOUNTER — PATIENT OUTREACH (OUTPATIENT)
Dept: CARE COORDINATION | Facility: CLINIC | Age: 85
End: 2025-05-15
Payer: COMMERCIAL

## 2025-06-09 NOTE — PROGRESS NOTES
"HISTORY OF PRESENT ILLNESS:     This is a 84 year old female who follows with Dr Marcano at [facility]  Her past medical history includes:  PVCs, hypertension     Ms Drummond was seen in clinic last 2024 following evidence of PVCs noted on EKG at primary clinic  She denied any significant palpitations  ECHO showed LVEF 55-60%, grade II diastolic dysfunction, normal RV function, mod-severe left atrial enlargement, 1+ pulmonic valvular regurgitation  Holter (5/2024) showed Sinus rhythm with average HR 71 bpm  PVC burden 16%, mainly isolated, PACs and brief SVT  Continued observance was planned due to her being asymptomatic     She was seen in the ED (3/21/25) for palpitations and positional dizziness and was found to be quite hypertensive  Labs and CXR were benign  EKG showed sinus rhythm at 87 bpm and nonspecific ST abnormality  She was given some IVFs due to mildly positive orthostatics  Metoprolol was started     Subsequent ZioPatch monitor (4/2025) showed Sinus rhythm with average HR 71 bpm, 113 brief runs of SVT, <1% PVC and PAC burden  Her symptoms did correspond with the PAC/PVCs    When seen in clinic last month, her Metoprolol was increased due to hypertension and some palpitations    Carotid ultrasound (5/6/25) showed < 50% bilateral internal carotid artery disease     Our visit today is for further review    Ms Drummond denies any significant cardiovascular complaints  She checks her blood pressure at home and notes some BPs > 140 mmHg at times   She specifically denies any chest pain, palpitations, or orthopnea  She does report some mild chronic ankle edema       BP (!) 182/60 (BP Location: Right arm, Patient Position: Sitting, Cuff Size: Adult Regular)   Pulse 51   Ht 1.626 m (5' 4\")   Wt 64.3 kg (141 lb 12.8 oz)   SpO2 99%   BMI 24.34 kg/m          IMPRESSION AND PLAN:     Frequent PVCs  Brief SVT  -improved symptoms and less PVCs on Metoprolol     Hypertension:  -on Metoprolol XR 50 mg (am) " 25 mg (pm)  -BP elevated  -will initiate hydrochlorothiazide 12. 5 mg  -return with BMP and BP check with RN in 2 wks    The total time for the visit today was 30 minutes which includes patient visit, reviewing of records, discussion, and placing of orders of the outpatient coordination of cardiovascular care as described.  The level of medical decision making during this visit was of moderate complexity.  Thank you for allowing me to participate in their care.    The longitudinal plan of care for the diagnosis(es)/condition(s) as documented were addressed during this visit. Due to the added complexity in care, I will continue to support Nadiya in the subsequent management and with ongoing continuity of care.        Orders Placed This Encounter   Procedures    Basic metabolic panel    Follow-Up with Cardiology Nurse    Follow-Up with Cardiology       Orders Placed This Encounter   Medications    hydrochlorothiazide (MICROZIDE) 12.5 MG capsule     Sig: Take 1 capsule (12.5 mg) by mouth daily.     Dispense:  90 capsule     Refill:  3       There are no discontinued medications.      Encounter Diagnoses   Name Primary?    PVC's (premature ventricular contractions)     Benign essential hypertension Yes       CURRENT MEDICATIONS:  Current Outpatient Medications   Medication Sig Dispense Refill    CALCIUM-VITAMIN D PO       hydrochlorothiazide (MICROZIDE) 12.5 MG capsule Take 1 capsule (12.5 mg) by mouth daily. 90 capsule 3    metoprolol succinate ER (TOPROL XL) 50 MG 24 hr tablet Take 1.5 tablets (75 mg) by mouth daily. 135 tablet 3    Multiple Vitamins-Minerals (OCUVITE PO) Take by mouth daily.         ALLERGIES     Allergies   Allergen Reactions    Alendronate Muscle Pain (Myalgia)       PAST MEDICAL HISTORY:  Past Medical History:   Diagnosis Date    Lactose intolerance     TMJ (temporomandibular joint disorder)     Tobacco abuse        PAST SURGICAL HISTORY:  Past Surgical History:   Procedure Laterality Date     BLEPHAROPLASTY Bilateral 01/01/2002    BUNIONECTOMY Left 01/01/2007    CATARACT IOL, RT/LT Bilateral 01/01/2007    DAVINCI XI HERNIORRHAPHY INGUINAL Left 12/12/2024    Procedure: Robotic assisted bilateral inguinal hernia repair with mesh and ventral hernia repair;  Surgeon: Gerry Freeman MD;  Location:  OR    ORTHOPEDIC SURGERY      REPAIR HAMMER TOE Bilateral 4/24/2024    Procedure: 1.  HAMMERTOE REPAIR RIGHT 2ND TOE; 2.  BILATERAL BONE SPUR RESECTION;  Surgeon: Justin Sanchez DPM;  Location:  OR       FAMILY HISTORY:  Family History   Problem Relation Age of Onset    Heart Disease Mother     Heart Disease Father     LUNG DISEASE Sister         unclear, asthma like    Diabetes Type 2  Sister         resolved after colon cancer surgery    Alcohol/Drug Brother         lived in [facility]    Diabetes Type 1 Daughter     Allergies Daughter     Diabetes Type 1 Grandchild     Colon Cancer Sister        SOCIAL HISTORY:  Social History     Socioeconomic History    Marital status: Single     Spouse name: None    Number of children: 3    Years of education: None    Highest education level: None   Tobacco Use    Smoking status: Former     Current packs/day: 0.00     Types: Cigarettes     Start date: 1/1/1965     Quit date: 5/29/2022     Years since quitting: 3.0     Passive exposure: Past    Smokeless tobacco: Never   Vaping Use    Vaping status: Never Used   Substance and Sexual Activity    Alcohol use: No    Drug use: No    Sexual activity: Not Currently     Partners: Male     Birth control/protection: None   Other Topics Concern     Service No    Blood Transfusions No    Exercise No    Seat Belt Yes    Self-Exams No    Parent/sibling w/ CABG, MI or angioplasty before 65F 55M? No   Social History Narrative    07-    Balanced Diet - Yes    Osteoporosis Prevention Measures - Dairy servings per day: 1 and Medication/Supplements (See current meds)    Regular Exercise -  No Describe     Dental Exam -  has false teeth,got them  about 9 months ago    Eye Exam - 2006    Self Breast Exam - No    Abuse: Current or Past (Physical, Sexual or Emotional)- No    Do you feel safe in your environment - Yes    Guns stored in the home - Yes    Sunscreen used - No    Seatbelts used - Yes    Lipids -      Glucose -      Colon Cancer Screening - Flexible Sigmoidoscopy about 5 years ago, at the [facility](date completed)    Hemoccults - NO    Pap Test -  About 3 years ago    Do you have any concerns about STD's -  No    Mammography - about 3 years ago    DEXA - about 3 years ago    Immunizations reviewed and up to date - pt. Thinks she had a TD, shot a year ago             Social Drivers of Health     Financial Resource Strain: Low Risk  (1/15/2024)    Financial Resource Strain     Within the past 12 months, have you or your family members you live with been unable to get utilities (heat, electricity) when it was really needed?: No   Food Insecurity: Low Risk  (1/15/2024)    Food Insecurity     Within the past 12 months, did you worry that your food would run out before you got money to buy more?: No     Within the past 12 months, did the food you bought just not last and you didn t have money to get more?: No   Transportation Needs: Low Risk  (1/15/2024)    Transportation Needs     Within the past 12 months, has lack of transportation kept you from medical appointments, getting your medicines, non-medical meetings or appointments, work, or from getting things that you need?: No   Interpersonal Safety: Low Risk  (12/12/2024)    Interpersonal Safety     Do you feel physically and emotionally safe where you currently live?: Yes     Within the past 12 months, have you been hit, slapped, kicked or otherwise physically hurt by someone?: No     Within the past 12 months, have you been humiliated or emotionally abused in other ways by your partner or ex-partner?: No   Housing Stability: Low Risk  (1/15/2024)    Housing  "Stability     Do you have housing? : Yes     Are you worried about losing your housing?: No       Review of Systems:  Skin:          Eyes:         ENT:         Respiratory:  Negative       Cardiovascular:  Negative      Gastroenterology:        Genitourinary:         Musculoskeletal:         Neurologic:         Psychiatric:         Heme/Lymph/Imm:         Endocrine:           Physical Exam:  Vitals: BP (!) 182/60 (BP Location: Right arm, Patient Position: Sitting, Cuff Size: Adult Regular)   Pulse 51   Ht 1.626 m (5' 4\")   Wt 64.3 kg (141 lb 12.8 oz)   SpO2 99%   BMI 24.34 kg/m      Constitutional:  cooperative thin      Skin:  warm and dry to the touch          Head:           Eyes:  pupils equal and round        Lymph:      ENT:  no pallor or cyanosis        Neck:  JVP normal        Respiratory:  clear to auscultation, normal respiratory excursion         Cardiac: regular rhythm     no presence of murmur          pulses full and equal                                        GI:           Extremities and Muscular Skeletal:      varicose vein, trace, bilateral LE edema          Neurological:  affect appropriate        Psych:  Alert and Oriented x 3          CC  Swati Cronin APRN CNP  [address]  [place] [id number]                    "

## 2025-06-10 ENCOUNTER — OFFICE VISIT (OUTPATIENT)
Dept: CARDIOLOGY | Facility: CLINIC | Age: 85
End: 2025-06-10
Attending: NURSE PRACTITIONER
Payer: COMMERCIAL

## 2025-06-10 VITALS
BODY MASS INDEX: 24.21 KG/M2 | DIASTOLIC BLOOD PRESSURE: 60 MMHG | SYSTOLIC BLOOD PRESSURE: 182 MMHG | HEIGHT: 64 IN | HEART RATE: 51 BPM | WEIGHT: 141.8 LBS | OXYGEN SATURATION: 99 %

## 2025-06-10 DIAGNOSIS — I10 BENIGN ESSENTIAL HYPERTENSION: Primary | ICD-10-CM

## 2025-06-10 DIAGNOSIS — I49.3 PVC'S (PREMATURE VENTRICULAR CONTRACTIONS): ICD-10-CM

## 2025-06-10 RX ORDER — HYDROCHLOROTHIAZIDE 12.5 MG/1
12.5 CAPSULE ORAL DAILY
Qty: 90 CAPSULE | Refills: 3 | Status: SHIPPED | OUTPATIENT
Start: 2025-06-10

## 2025-06-10 NOTE — PATIENT INSTRUCTIONS
Start hydrochlorothiazide 12.5 mg every morning for your blood pressure  Return in 2 wks for labs and BP check with nurse  Call with any intolerance    It was a pleasure seeing you today     Please do not hesitate to call my nurse team with any questions or concerns:  [phone]    Scheduling number:  [phone]    DARWIN Marquez, CNP

## 2025-06-10 NOTE — LETTER
"6/10/2025    Carmen Beckett MD  [address]  [place] [address]    RE: Nadiya JOELLE Bhanu       Dear Colleague,     I had the pleasure of seeing Nadiya Drummond in the [facility].  HISTORY OF PRESENT ILLNESS:     This is a 84 year old female who follows with Dr Marcano at [facility]  Her past medical history includes:  PVCs, hypertension     Ms Drummond was seen in clinic last 2024 following evidence of PVCs noted on EKG at primary clinic  She denied any significant palpitations  ECHO showed LVEF 55-60%, grade II diastolic dysfunction, normal RV function, mod-severe left atrial enlargement, 1+ pulmonic valvular regurgitation  Holter (5/2024) showed Sinus rhythm with average HR 71 bpm  PVC burden 16%, mainly isolated, PACs and brief SVT  Continued observance was planned due to her being asymptomatic     She was seen in the ED (3/21/25) for palpitations and positional dizziness and was found to be quite hypertensive  Labs and CXR were benign  EKG showed sinus rhythm at 87 bpm and nonspecific ST abnormality  She was given some IVFs due to mildly positive orthostatics  Metoprolol was started     Subsequent ZioPatch monitor (4/2025) showed Sinus rhythm with average HR 71 bpm, 113 brief runs of SVT, <1% PVC and PAC burden  Her symptoms did correspond with the PAC/PVCs    When seen in clinic last month, her Metoprolol was increased due to hypertension and some palpitations    Carotid ultrasound (5/6/25) showed < 50% bilateral internal carotid artery disease     Our visit today is for further review    Ms Drummond denies any significant cardiovascular complaints  She checks her blood pressure at home and notes some BPs > 140 mmHg at times   She specifically denies any chest pain, palpitations, or orthopnea  She does report some mild chronic ankle edema       BP (!) 182/60 (BP Location: Right arm, Patient Position: Sitting, Cuff Size: Adult Regular)   Pulse 51   Ht 1.626 m (5' 4\") "   Wt 64.3 kg (141 lb 12.8 oz)   SpO2 99%   BMI 24.34 kg/m          IMPRESSION AND PLAN:     Frequent PVCs  Brief SVT  -improved symptoms and less PVCs on Metoprolol     Hypertension:  -on Metoprolol XR 50 mg (am) 25 mg (pm)  -BP elevated  -will initiate hydrochlorothiazide 12. 5 mg  -return with BMP and BP check with RN in 2 wks    The total time for the visit today was 30 minutes which includes patient visit, reviewing of records, discussion, and placing of orders of the outpatient coordination of cardiovascular care as described.  The level of medical decision making during this visit was of moderate complexity.  Thank you for allowing me to participate in their care.    The longitudinal plan of care for the diagnosis(es)/condition(s) as documented were addressed during this visit. Due to the added complexity in care, I will continue to support Nadiya in the subsequent management and with ongoing continuity of care.        Orders Placed This Encounter   Procedures     Basic metabolic panel     Follow-Up with Cardiology Nurse     Follow-Up with Cardiology       Orders Placed This Encounter   Medications     hydrochlorothiazide (MICROZIDE) 12.5 MG capsule     Sig: Take 1 capsule (12.5 mg) by mouth daily.     Dispense:  90 capsule     Refill:  3       There are no discontinued medications.      Encounter Diagnoses   Name Primary?     PVC's (premature ventricular contractions)      Benign essential hypertension Yes       CURRENT MEDICATIONS:  Current Outpatient Medications   Medication Sig Dispense Refill     CALCIUM-VITAMIN D PO        hydrochlorothiazide (MICROZIDE) 12.5 MG capsule Take 1 capsule (12.5 mg) by mouth daily. 90 capsule 3     metoprolol succinate ER (TOPROL XL) 50 MG 24 hr tablet Take 1.5 tablets (75 mg) by mouth daily. 135 tablet 3     Multiple Vitamins-Minerals (OCUVITE PO) Take by mouth daily.         ALLERGIES     Allergies   Allergen Reactions     Alendronate Muscle Pain (Myalgia)       PAST  MEDICAL HISTORY:  Past Medical History:   Diagnosis Date     Lactose intolerance      TMJ (temporomandibular joint disorder)      Tobacco abuse        PAST SURGICAL HISTORY:  Past Surgical History:   Procedure Laterality Date     BLEPHAROPLASTY Bilateral 01/01/2002     BUNIONECTOMY Left 01/01/2007     CATARACT IOL, RT/LT Bilateral 01/01/2007     DAVINCI XI HERNIORRHAPHY INGUINAL Left 12/12/2024    Procedure: Robotic assisted bilateral inguinal hernia repair with mesh and ventral hernia repair;  Surgeon: Gerry Freeman MD;  Location: [facility] OR     ORTHOPEDIC SURGERY       REPAIR HAMMER TOE Bilateral 4/24/2024    Procedure: 1.  HAMMERTOE REPAIR RIGHT 2ND TOE; 2.  BILATERAL BONE SPUR RESECTION;  Surgeon: Justin Sanchez DPM;  Location:  OR       FAMILY HISTORY:  Family History   Problem Relation Age of Onset     Heart Disease Mother      Heart Disease Father      LUNG DISEASE Sister         unclear, asthma like     Diabetes Type 2  Sister         resolved after colon cancer surgery     Alcohol/Drug Brother         lived in [facility]     Diabetes Type 1 Daughter      Allergies Daughter      Diabetes Type 1 Grandchild      Colon Cancer Sister        SOCIAL HISTORY:  Social History     Socioeconomic History     Marital status: Single     Spouse name: None     Number of children: 3     Years of education: None     Highest education level: None   Tobacco Use     Smoking status: Former     Current packs/day: 0.00     Types: Cigarettes     Start date: 1/1/1965     Quit date: 5/29/2022     Years since quitting: 3.0     Passive exposure: Past     Smokeless tobacco: Never   Vaping Use     Vaping status: Never Used   Substance and Sexual Activity     Alcohol use: No     Drug use: No     Sexual activity: Not Currently     Partners: Male     Birth control/protection: None   Other Topics Concern      Service No     Blood Transfusions No     Exercise No     Seat Belt Yes     Self-Exams No     Parent/sibling w/ CABG, MI or  angioplasty before 65F 55M? No   Social History Narrative    07-    Balanced Diet - Yes    Osteoporosis Prevention Measures - Dairy servings per day: 1 and Medication/Supplements (See current meds)    Regular Exercise -  No Describe     Dental Exam - has false teeth,got them  about 9 months ago    Eye Exam - 2006    Self Breast Exam - No    Abuse: Current or Past (Physical, Sexual or Emotional)- No    Do you feel safe in your environment - Yes    Guns stored in the home - Yes    Sunscreen used - No    Seatbelts used - Yes    Lipids -      Glucose -      Colon Cancer Screening - Flexible Sigmoidoscopy about 5 years ago, at the [facility](date completed)    Hemoccults - NO    Pap Test -  About 3 years ago    Do you have any concerns about STD's -  No    Mammography - about 3 years ago    DEXA - about 3 years ago    Immunizations reviewed and up to date - pt. Thinks she had a TD, shot a year ago             Social Drivers of Health     Financial Resource Strain: Low Risk  (1/15/2024)    Financial Resource Strain      Within the past 12 months, have you or your family members you live with been unable to get utilities (heat, electricity) when it was really needed?: No   Food Insecurity: Low Risk  (1/15/2024)    Food Insecurity      Within the past 12 months, did you worry that your food would run out before you got money to buy more?: No      Within the past 12 months, did the food you bought just not last and you didn t have money to get more?: No   Transportation Needs: Low Risk  (1/15/2024)    Transportation Needs      Within the past 12 months, has lack of transportation kept you from medical appointments, getting your medicines, non-medical meetings or appointments, work, or from getting things that you need?: No   Interpersonal Safety: Low Risk  (12/12/2024)    Interpersonal Safety      Do you feel physically and emotionally safe where you currently live?: Yes      Within the past 12  "months, have you been hit, slapped, kicked or otherwise physically hurt by someone?: No      Within the past 12 months, have you been humiliated or emotionally abused in other ways by your partner or ex-partner?: No   Housing Stability: Low Risk  (1/15/2024)    Housing Stability      Do you have housing? : Yes      Are you worried about losing your housing?: No       Review of Systems:  Skin:          Eyes:         ENT:         Respiratory:  Negative       Cardiovascular:  Negative      Gastroenterology:        Genitourinary:         Musculoskeletal:         Neurologic:         Psychiatric:         Heme/Lymph/Imm:         Endocrine:           Physical Exam:  Vitals: BP (!) 182/60 (BP Location: Right arm, Patient Position: Sitting, Cuff Size: Adult Regular)   Pulse 51   Ht 1.626 m (5' 4\")   Wt 64.3 kg (141 lb 12.8 oz)   SpO2 99%   BMI 24.34 kg/m      Constitutional:  cooperative thin      Skin:  warm and dry to the touch          Head:           Eyes:  pupils equal and round        Lymph:      ENT:  no pallor or cyanosis        Neck:  JVP normal        Respiratory:  clear to auscultation, normal respiratory excursion         Cardiac: regular rhythm     no presence of murmur          pulses full and equal                                        GI:           Extremities and Muscular Skeletal:      varicose vein, trace, bilateral LE edema          Neurological:  affect appropriate        Psych:  Alert and Oriented x 3          CC  DARWIN Molina CNP  [address]  [place] [id number]                      Thank you for allowing me to participate in the care of your patient.      Sincerely,     DARWIN Michaels CNP     [facility]  cc:   DARWIN Molina CNP  [address]  [place] [id number]      "

## 2025-06-24 ENCOUNTER — LAB (OUTPATIENT)
Dept: LAB | Facility: CLINIC | Age: 85
End: 2025-06-24
Payer: COMMERCIAL

## 2025-06-24 ENCOUNTER — ALLIED HEALTH/NURSE VISIT (OUTPATIENT)
Dept: CARDIOLOGY | Facility: CLINIC | Age: 85
End: 2025-06-24
Payer: COMMERCIAL

## 2025-06-24 ENCOUNTER — DOCUMENTATION ONLY (OUTPATIENT)
Dept: CARDIOLOGY | Facility: CLINIC | Age: 85
End: 2025-06-24

## 2025-06-24 ENCOUNTER — RESULTS FOLLOW-UP (OUTPATIENT)
Dept: CARDIOLOGY | Facility: CLINIC | Age: 85
End: 2025-06-24

## 2025-06-24 VITALS — DIASTOLIC BLOOD PRESSURE: 54 MMHG | SYSTOLIC BLOOD PRESSURE: 142 MMHG | HEART RATE: 60 BPM

## 2025-06-24 DIAGNOSIS — I10 BENIGN ESSENTIAL HYPERTENSION: ICD-10-CM

## 2025-06-24 LAB
ANION GAP SERPL CALCULATED.3IONS-SCNC: 9 MMOL/L (ref 7–15)
BUN SERPL-MCNC: 25 MG/DL (ref 8–23)
CALCIUM SERPL-MCNC: 8.7 MG/DL (ref 8.8–10.4)
CHLORIDE SERPL-SCNC: 104 MMOL/L (ref 98–107)
CREAT SERPL-MCNC: 0.87 MG/DL (ref 0.51–0.95)
EGFRCR SERPLBLD CKD-EPI 2021: 65 ML/MIN/1.73M2
GLUCOSE SERPL-MCNC: 180 MG/DL (ref 70–99)
HCO3 SERPL-SCNC: 23 MMOL/L (ref 22–29)
POTASSIUM SERPL-SCNC: 3.8 MMOL/L (ref 3.4–5.3)
SODIUM SERPL-SCNC: 136 MMOL/L (ref 135–145)

## 2025-06-24 NOTE — PROGRESS NOTES
Sent message to pt regarding BP check and Labs.     Jennifer HARRIS  [facility]

## 2025-06-24 NOTE — PROGRESS NOTES
[facility] BLOOD PRESSURE CHECK     Last office visit: 6/10/25    Previous blood pressure: 182/60 mm Hg  Previous heart rate: 51 bpm      Time of visit: 1:55 pm    Morning medications were taken at: 6:00 am      Today's blood pressure: 142/54 mm Hg  Today's heart rate: 60 bpm       Additional Comments: Edema has improved since starting hydrochlorothiazide. She has a home blood pressure monitor but she is not sure if it is accurate, she states her blood pressure goes up and down. She did not bring her machine in with her today to check accuracy.      Results routed to:  Nurses      Ordering Provider: Swati Cronin  In clinic Provider: Dr. Garcia

## 2025-06-26 PROBLEM — R73.01 IMPAIRED FASTING GLUCOSE: Status: ACTIVE | Noted: 2025-06-26

## (undated) DEVICE — BNDG KLING 3" 2232

## (undated) DEVICE — CAST PADDING 4" UNSTERILE 9044

## (undated) DEVICE — DRSG KERLIX FLUFFS X5

## (undated) DEVICE — SYR 10ML FINGER CONTROL W/O NDL 309695

## (undated) DEVICE — SOL WATER IRRIG 1000ML BOTTLE 2F7114

## (undated) DEVICE — SU ETHILON 5-0 PC-3 18" 1865G

## (undated) DEVICE — PACK EXTREMITY SOP15EXFSD

## (undated) DEVICE — BLADE SAW SAGITTAL 25.5X9.5X.4MM FINE LINVATEC 5023-138

## (undated) DEVICE — BNDG ELASTIC 4"X5YDS STERILE 6611-4S

## (undated) DEVICE — SU VICRYL 3-0 PS-2 18" UND J497H

## (undated) DEVICE — PAD CHUX UNDERPAD 23X24" 7136

## (undated) DEVICE — SU FIBERWIRE 0 38" BLUE 22.2MM W/TAPER NDL  AR-7250

## (undated) DEVICE — DRSG XEROFORM 1X8"

## (undated) DEVICE — CAST PADDING 4" STERILE 9044S

## (undated) DEVICE — DECANTER BAG 2002S

## (undated) DEVICE — BLADE SAW OSCILLATING STRYK MED 9.0X25X0.38MM 2296-003-111

## (undated) DEVICE — SU VICRYL 5-0 P-3 18" UND J493G

## (undated) DEVICE — IMM LIMB ELEVATOR DC40-0203

## (undated) DEVICE — SU ETHILON 4-0 FS-2 18" 662H

## (undated) DEVICE — DRAPE STERI TOWEL LG 1010

## (undated) DEVICE — ESU PENCIL W/HOLSTER E2350H

## (undated) DEVICE — NDL 22GA 1.5"

## (undated) DEVICE — BNDG KLING 4" 2236

## (undated) DEVICE — DRAPE MINI C-ARM 4003

## (undated) DEVICE — SU VICRYL 4-0 PS-2 18" UND J496H

## (undated) DEVICE — PREP CHLORAPREP 26ML TINTED HI-LITE ORANGE 930815

## (undated) DEVICE — BNDG ELASTIC 6"X5YDS UNSTERILE 6611-60

## (undated) DEVICE — BLADE KNIFE SURG 15 371115

## (undated) DEVICE — TOURNIQUET CUFF 18" STERILE

## (undated) DEVICE — ESU GROUND PAD ADULT W/CORD E7507

## (undated) RX ORDER — FENTANYL CITRATE 50 UG/ML
INJECTION, SOLUTION INTRAMUSCULAR; INTRAVENOUS
Status: DISPENSED
Start: 2024-04-24

## (undated) RX ORDER — BUPIVACAINE HYDROCHLORIDE 2.5 MG/ML
INJECTION, SOLUTION EPIDURAL; INFILTRATION; INTRACAUDAL
Status: DISPENSED
Start: 2024-04-24

## (undated) RX ORDER — ONDANSETRON 2 MG/ML
INJECTION INTRAMUSCULAR; INTRAVENOUS
Status: DISPENSED
Start: 2024-04-24

## (undated) RX ORDER — CEFAZOLIN SODIUM/WATER 2 G/20 ML
SYRINGE (ML) INTRAVENOUS
Status: DISPENSED
Start: 2024-04-24

## (undated) RX ORDER — PROPOFOL 10 MG/ML
INJECTION, EMULSION INTRAVENOUS
Status: DISPENSED
Start: 2024-04-24